# Patient Record
Sex: FEMALE | Race: WHITE | HISPANIC OR LATINO | Employment: FULL TIME | URBAN - METROPOLITAN AREA
[De-identification: names, ages, dates, MRNs, and addresses within clinical notes are randomized per-mention and may not be internally consistent; named-entity substitution may affect disease eponyms.]

---

## 2018-03-12 ENCOUNTER — OFFICE VISIT CONVERTED (OUTPATIENT)
Dept: ORTHOPEDIC SURGERY | Facility: CLINIC | Age: 58
End: 2018-03-12
Attending: PHYSICIAN ASSISTANT

## 2018-04-09 ENCOUNTER — CONVERSION ENCOUNTER (OUTPATIENT)
Dept: ORTHOPEDIC SURGERY | Facility: CLINIC | Age: 58
End: 2018-04-09

## 2018-04-09 ENCOUNTER — OFFICE VISIT CONVERTED (OUTPATIENT)
Dept: ORTHOPEDIC SURGERY | Facility: CLINIC | Age: 58
End: 2018-04-09
Attending: PHYSICIAN ASSISTANT

## 2018-04-30 ENCOUNTER — OFFICE VISIT CONVERTED (OUTPATIENT)
Dept: ORTHOPEDIC SURGERY | Facility: CLINIC | Age: 58
End: 2018-04-30
Attending: PHYSICIAN ASSISTANT

## 2018-08-13 ENCOUNTER — OFFICE VISIT CONVERTED (OUTPATIENT)
Dept: ORTHOPEDIC SURGERY | Facility: CLINIC | Age: 58
End: 2018-08-13
Attending: ORTHOPAEDIC SURGERY

## 2018-12-03 ENCOUNTER — OFFICE VISIT CONVERTED (OUTPATIENT)
Dept: ORTHOPEDIC SURGERY | Facility: CLINIC | Age: 58
End: 2018-12-03
Attending: PHYSICIAN ASSISTANT

## 2018-12-03 ENCOUNTER — CONVERSION ENCOUNTER (OUTPATIENT)
Dept: ORTHOPEDIC SURGERY | Facility: CLINIC | Age: 58
End: 2018-12-03

## 2019-02-20 ENCOUNTER — OFFICE VISIT CONVERTED (OUTPATIENT)
Dept: FAMILY MEDICINE CLINIC | Facility: CLINIC | Age: 59
End: 2019-02-20
Attending: FAMILY MEDICINE

## 2019-02-20 ENCOUNTER — CONVERSION ENCOUNTER (OUTPATIENT)
Dept: FAMILY MEDICINE CLINIC | Facility: CLINIC | Age: 59
End: 2019-02-20

## 2019-03-06 ENCOUNTER — OFFICE VISIT CONVERTED (OUTPATIENT)
Dept: ORTHOPEDIC SURGERY | Facility: CLINIC | Age: 59
End: 2019-03-06
Attending: PHYSICIAN ASSISTANT

## 2019-10-08 ENCOUNTER — CONVERSION ENCOUNTER (OUTPATIENT)
Dept: FAMILY MEDICINE CLINIC | Facility: CLINIC | Age: 59
End: 2019-10-08

## 2019-10-08 ENCOUNTER — OFFICE VISIT CONVERTED (OUTPATIENT)
Dept: FAMILY MEDICINE CLINIC | Facility: CLINIC | Age: 59
End: 2019-10-08
Attending: FAMILY MEDICINE

## 2019-11-01 ENCOUNTER — OFFICE VISIT CONVERTED (OUTPATIENT)
Dept: ORTHOPEDIC SURGERY | Facility: CLINIC | Age: 59
End: 2019-11-01
Attending: ORTHOPAEDIC SURGERY

## 2019-12-05 ENCOUNTER — HOSPITAL ENCOUNTER (OUTPATIENT)
Dept: PERIOP | Facility: HOSPITAL | Age: 59
Setting detail: HOSPITAL OUTPATIENT SURGERY
Discharge: HOME | End: 2019-12-05
Attending: ORTHOPAEDIC SURGERY

## 2019-12-18 ENCOUNTER — OFFICE VISIT CONVERTED (OUTPATIENT)
Dept: ORTHOPEDIC SURGERY | Facility: CLINIC | Age: 59
End: 2019-12-18
Attending: PHYSICIAN ASSISTANT

## 2019-12-19 ENCOUNTER — OFFICE VISIT CONVERTED (OUTPATIENT)
Dept: FAMILY MEDICINE CLINIC | Facility: CLINIC | Age: 59
End: 2019-12-19
Attending: FAMILY MEDICINE

## 2019-12-19 ENCOUNTER — CONVERSION ENCOUNTER (OUTPATIENT)
Dept: FAMILY MEDICINE CLINIC | Facility: CLINIC | Age: 59
End: 2019-12-19

## 2020-01-03 ENCOUNTER — HOSPITAL ENCOUNTER (OUTPATIENT)
Dept: MRI IMAGING | Facility: HOSPITAL | Age: 60
Discharge: HOME | End: 2020-01-03
Attending: FAMILY MEDICINE

## 2020-01-14 ENCOUNTER — OFFICE VISIT CONVERTED (OUTPATIENT)
Dept: FAMILY MEDICINE CLINIC | Facility: CLINIC | Age: 60
End: 2020-01-14
Attending: FAMILY MEDICINE

## 2020-01-15 ENCOUNTER — OFFICE VISIT CONVERTED (OUTPATIENT)
Dept: ORTHOPEDIC SURGERY | Facility: CLINIC | Age: 60
End: 2020-01-15
Attending: PHYSICIAN ASSISTANT

## 2020-06-24 ENCOUNTER — OFFICE VISIT CONVERTED (OUTPATIENT)
Dept: FAMILY MEDICINE CLINIC | Facility: CLINIC | Age: 60
End: 2020-06-24
Attending: FAMILY MEDICINE

## 2020-08-17 ENCOUNTER — OFFICE VISIT CONVERTED (OUTPATIENT)
Dept: FAMILY MEDICINE CLINIC | Facility: CLINIC | Age: 60
End: 2020-08-17
Attending: FAMILY MEDICINE

## 2020-11-17 ENCOUNTER — OFFICE VISIT CONVERTED (OUTPATIENT)
Dept: FAMILY MEDICINE CLINIC | Facility: CLINIC | Age: 60
End: 2020-11-17
Attending: FAMILY MEDICINE

## 2021-02-22 ENCOUNTER — OFFICE VISIT CONVERTED (OUTPATIENT)
Dept: FAMILY MEDICINE CLINIC | Facility: CLINIC | Age: 61
End: 2021-02-22
Attending: FAMILY MEDICINE

## 2021-05-13 NOTE — PROGRESS NOTES
Progress Note      Patient Name: Shereen Alanis   Patient ID: [id number]   Sex: Female   YOB: 1960    Primary Care Provider: Augusto Leung DO   Referring Provider: Augusto Leung DO    Visit Date: November 17, 2020    Provider: Augusto Leung DO   Location: [facility]   Location Address: [address] [place] [address], [address] [place] [address]  [id number]   Location Phone: [phone]          Chief Complaint  · check up      History Of Present Illness  Shereen Alanis is a 60 year old /White,  or  female who presents for evaluation and treatment of:      Patient presents today for checkup.  She does need a renewal of referrals for her macular degeneration as a well as Stargardt's disease.  She also reports having a left earache.  She does have issues with allergies.  She has not been taking her Flonase regularly.  Denies any worsening nasal congestion.  Denies any fever chills.  Denies any sore throat.       Past Medical History  Aftercare following left knee joint replacement surgery; Allergies; Arthritis; Breast cancer screening; Cataract; Cervical cancer screening; Colon cancer screening; Essential tremor; Hyperlipemia; Left knee pain; Limb Swelling; Primary osteoarthritis of left knee; Seasonal allergies         Past Surgical History  Colonoscopy; Joint Surgery; Knee surgery         Medication List  cetirizine 10 mg oral tablet; fluticasone propionate 50 mcg/actuation nasal spray,suspension; medroxyprogesterone 2.5 mg oral tablet; meloxicam 15 mg oral tablet; prednisolone acetate 1 % ophthalmic (eye) drops,suspension; Premarin 0.3 mg oral tablet         Allergy List  SULFA (SULFONAMIDES)       Allergies Reconciled  Family Medical History  Cancer, Unspecified; Diabetes, unspecified type; Renal Cancer; *No Known Family History; Family history of Arthritis         Social History  Alcohol (Current some day); Alcohol Use (Current some day); Caffeine (Unknown);  "lives with spouse; .; Recreational Drug Use (Never); Second hand smoke exposure (Never); Tobacco (Never); Working         Immunizations  Name Date Admin   Influenza Refused   Influenza Refused   Influenza Refused   Influenza Refused   Influenza 11/14/2019         Review of Systems     Gen: Denies any fever, chills, or weight changes  HEENT: As discussed above  Extremities: Denies edema  Psychiatric: Denies any changes in mood or affect  Neurologic: Denies any deficits  Skin: Denies any rashes       Vitals  Date Time BP Position Site L\R Cuff Size HR RR TEMP (F) WT  HT  BMI kg/m2 BSA m2 O2 Sat FR L/min FiO2 HC       11/17/2020 02:34 /74 Sitting    69 - R  97.5 152lbs 4oz 5'  3\" 26.97 1.75 99 %            Physical Examination     General: AAO 3, no acute distress, pleasant  HEENT: Normocephalic, atraumatic, TM intact bilaterally with no erythema, no discharge from the eyes, no discharge from the nose, no oropharyngeal erythema or exudate, no cervical tenderness lymphadenopathy  Cardiovascular: Regular rate and rhythm without appreciable murmur  Respiratory: Clear to auscultation bilaterally no RRW  Gastrointestinal: Soft nontender nondistended with bowel sounds present  extremities: No clubbing, cyanosis or edema  Neurologic: CN II through XII grossly intact   Psychiatric: Normal mood and affect           Assessment  · Need for influenza vaccination     V04.81/Z23  · Macular degeneration     362.50/H35.30  · Stargardt's disease     362.75/H35.53  · Right shoulder pain     719.41/M25.511  · Pain     780.96/R52  · Left ear pain     388.70/H92.02  · Eustachian tube dysfunction, left     381.81/H69.82  Discussed with patient conservative treatment at this time. I have encouraged her to use Flonase regularly. She will also continue taking cetirizine. She reports that it took a while to get back on her allergy injections but she is doing those now. I encouraged her to follow-up with her allergist as well. I do " not suspect otitis media at this time. If symptoms worsen she is instructed to call or return. Otherwise I will see her back in 3 months.      Plan  · Orders  o ACO-39: Current medications updated and reviewed (1159F, ) - - 11/17/2020  o ACO-14: Influenza immunization was not administered for reasons documented [facility] () - - 11/17/2020   Declines  o OPHTHALMOLOGY CONSULTATION (OPHTH) - 362.50/H35.30, 362.75/H35.53 - 11/17/2020   Needs two referral. one to [facility]. Needs appointments with Dr. Jens kothari OPHTHALMOLOGY CONSULTATION (OPHTH) - 362.50/H35.30, 362.75/H35.53 - 11/17/2020   Needs two referrals. Needs to see Dr. Sigifredo Fragoso in [place] with eye [facility].   · Medications  o cetirizine 10 mg oral tablet   SIG: take 1 tablet (10 mg) by oral route once daily   DISP: (90) Tablet with 3 refills  Prescribed on 11/17/2020     o meloxicam 15 mg oral tablet   SIG: take 1 tablet (15 mg) by oral route once daily as needed for pain   DISP: (30) Tablet with 3 refills  Refilled on 11/17/2020     · Instructions  o Patient was educated/instructed on their diagnosis, treatment and medications prior to discharge from the clinic today.  o Patient instructed to seek medical attention urgently for new or worsening symptoms.  o Call the office with any concerns or questions.  · Disposition  o Follow Up in 3 months.            Electronically Signed by: Augusto Leung DO -Author on November 17, 2020 05:45:02 PM

## 2021-05-13 NOTE — PROGRESS NOTES
Progress Note      Patient Name: Shereen Alanis   Patient ID: [id number]   Sex: Female   YOB: 1960    Primary Care Provider: Augusto Leung DO   Referring Provider: Augusto Leung DO    Visit Date: August 17, 2020    Provider: Augusto Leung DO   Location: [facility]   Location Address: [address] [place] [address], [address]  [id number]   Location Phone: [phone]          Chief Complaint  · 1 month follow up       History Of Present Illness  Shereen Alanis is a 59 year old /White,  or  female who presents for evaluation and treatment of:      Patient presents today for one-month follow-up.  She is back in to see podiatry and reports that her left Achilles tendinopathy is improving.  She is taking meloxicam on an as-needed basis.  She reports that she stopped taking medroxyprogesterone however she was on a cyclical dose of 2.5 mg 14 days each month.  She reports it has been 2 weeks since her last dose so she really has not missed any but she stopped taking it.  She is still taking Premarin.  I discussed with her given her history of having an intact uterus and that she needs to avoid unopposed estrogen.  She did have about a month ago light spotting when she wiped and this again happened on Thursday.  She has not had any recurrence since.  Discussed with her hormone replacement therapy for vasomotor symptoms.  I discussed with her risk and benefits in the past including increased risk of breast cancer and endometrial cancer.  She verbalized understanding.  She would like to continue current management for now which I feel is reasonable.  If she has any recurrence of any bleeding discussed with her that she will need further work-up and evaluation.       Past Medical History  Aftercare following left knee joint replacement surgery; Allergies; Arthritis; Breast cancer screening; Cataract; Cervical cancer screening; Colon cancer screening; Essential tremor;  "Hyperlipemia; Left knee pain; Limb Swelling; Primary osteoarthritis of left knee; Seasonal allergies         Past Surgical History  Colonoscopy; Joint Surgery; Knee surgery         Medication List  cetirizine 10 mg oral tablet; fluticasone propionate 50 mcg/actuation nasal spray,suspension; meloxicam 15 mg oral tablet; prednisolone acetate 1 % ophthalmic (eye) drops,suspension         Allergy List  SULFA (SULFONAMIDES)         Family Medical History  Cancer, Unspecified; Diabetes, unspecified type; Renal Cancer; *No Known Family History; Family history of Arthritis         Social History  Alcohol (Current some day); Alcohol Use (Current some day); Caffeine (Unknown); lives with spouse; .; Recreational Drug Use (Never); Second hand smoke exposure (Never); Tobacco (Never); Working         Immunizations  Name Date Admin   Influenza          Review of Systems     General: Denies any fever, chills, weight changes, or night sweats  HEENT:  Denies any vision or hearing changes. Denies any neck tenderness. Denies any headaches. Denies nasal congestion  Cardiovascular: Denies any chest pain or palpitations  respiratory: Denies any cough or wheezing. Denies any shortness of breath  Gastrointestinal: Denies any nausea vomiting or diarrhea, Denies constipation  Extremities: Denies any edema  Psychiatric: Denies any changes in mood or affect  Neurologic: Denies any neurologic deficits  skin: Denies any rashes or lesions.  endocrine: Denies any weight loss, fever, night sweats  Musculoskeletal: As discussed above       Vitals  Date Time BP Position Site L\R Cuff Size HR RR TEMP (F) WT  HT  BMI kg/m2 BSA m2 O2 Sat        08/17/2020 03:50 /72 Sitting    68 - R  96.6 150lbs 4oz 5'  3\" 26.62 1.74 98 %          Physical Examination     General: AAO 3, no acute distress, pleasant  HEENT: Normocephalic, atraumatic  Cardiovascular: Regular rate and rhythm without appreciable murmur  Respiratory: Clear to auscultation " bilaterally no RRW  Gastrointestinal: Soft nontender nondistended with bowel sounds present  extremities: No clubbing, cyanosis or edema  Neurologic: CN II through XII grossly intact   Psychiatric: Normal mood and affect           Assessment  · Visit for screening mammogram     V76.12/Z12.31  · Screening for depression     V79.0/Z13.31  · Vasomotor symptoms due to menopause     627.2/N95.1  · Achilles tendinosis of left lower extremity     726.71/M67.88      Plan  · Orders  o Screening Mammography; Bilateral 3D (15497, 42138, ) - V76.12/Z12.31 - 09/21/2020  o ACO-39: Current medications updated and reviewed () - - 08/17/2020  o ACO-18: Negative screen for clinical depression using a standardized tool () - V79.0/Z13.31 - 08/17/2020   score 0  · Medications  o medroxyprogesterone 2.5 mg oral tablet   SIG: take 1 tablet PO daily   DISP: (90) tablets with 1 refills  Prescribed on 08/17/2020     o Premarin 0.3 mg oral tablet   SIG: take 1 tablet (0.3 mg) by oral route once daily for 90 days   DISP: (90) tablets with 1 refills  Prescribed on 08/17/2020     o estrogens-methyltestosterone 0.625-1.25 mg oral tablet   SIG: ---   DISP: (0) tablet with 0 refills  Discontinued on 08/17/2020     o Medications have been Reconciled  o Transition of Care or Provider Policy  · Instructions  o Patient was educated/instructed on their diagnosis, treatment and medications prior to discharge from the clinic today.  o Call the office with any concerns or questions.  o Patient has previously been on medroxyprogesterone and Premarin. I tried switching her to estradiol and progesterone but she had weight gain. She is currently satisfied with her current treatment for vasomotor symptoms. They are most bothersome at work. Discussed with her risk and benefits. She does need a mammogram. I will see her back in 3 months.  · Disposition  o Follow Up in 1 month.            Electronically Signed by: Augusto Leung DO -Author on  August 17, 2020 05:15:27 PM

## 2021-05-13 NOTE — PROGRESS NOTES
Progress Note      Patient Name: Shereen Alanis   Patient ID: [id number]   Sex: Female   YOB: 1960    Primary Care Provider: Augusto Leung DO   Referring Provider: Augusto Leung DO    Visit Date: June 24, 2020    Provider: Augusto Leung DO   Location: [facility]   Location Address: [address] [place] [address], [address]  [id number]   Location Phone: [phone]          Chief Complaint  · acute   · left heel pain      History Of Present Illness  Shereen Alanis is a 59 year old /White,  or  female who presents for evaluation and treatment of:      .  Her Achilles tendon continues to bother her.  I ordered an MRI of her foot back in January which showed a moderate distal Achilles tendinopathy with an otherwise unremarkable MRI of the ankle, hindfoot and midfoot.  Patient did see Dr. Diaz and was given a boot and this helped but then her hip was starting to hurt so she stopped with the boot.  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  Diclofenac has helped some but not that well.  Discussed switching her to meloxicam.  She is requesting a referral back to see her podiatrist again as her referral ran out.  She is still having a lot of pain in the Achilles tendon region on the left.       Past Medical History  Aftercare following left knee joint replacement surgery; Allergies; Arthritis; Breast cancer screening; Cataract; Cervical cancer screening; Colon cancer screening; Essential tremor; Hyperlipemia; Left knee pain; Limb Swelling; Primary osteoarthritis of left knee; Seasonal allergies         Past Surgical History  Colonoscopy; Joint Surgery; Knee surgery         Medication List  cetirizine 10 mg oral tablet; fluticasone propionate 50 mcg/actuation nasal spray,suspension; medroxyprogesterone 2.5 mg oral tablet; prednisolone acetate 1 % ophthalmic (eye) drops,suspension         Allergy List  SULFA  "(SULFONAMIDES)         Family Medical History  Cancer, Unspecified; Diabetes, unspecified type; Renal Cancer; *No Known Family History; Family history of Arthritis         Social History  Alcohol (Current some day); Alcohol Use (Current some day); Caffeine (Unknown); lives with spouse; .; Recreational Drug Use (Never); Second hand smoke exposure (Never); Tobacco (Never); Working         Immunizations  Name Date Admin   Influenza          Review of Systems     Gen: Denies any fever, chills, or weight changes  HEENT: Denies any changes in vision or hearing  Extremities: Denies edema  Psychiatric: Denies any changes in mood or affect  Neurologic: Denies any deficits  Skin: Denies any rashes  Musculoskeletal: As described above       Vitals  Date Time BP Position Site L\R Cuff Size HR RR TEMP (F) WT  HT  BMI kg/m2 BSA m2 O2 Sat HC       06/24/2020 01:17 /74 Sitting    78 - R  97.5 155lbs 4oz 5'  3\" 27.5 1.77 97 %          Physical Examination     General: AAO 3, no acute distress, pleasant  Musculoskeletal: Patient has tenderness over the Achilles tendon on the left.  No mass or deformity appreciated.           Assessment  · Achilles tendinosis of left lower extremity     726.71/M67.88    Problems Reconciled  Plan  · Orders  o ACO-39: Current medications updated and reviewed () - - 06/24/2020  o PODIATRY CONSULTATION (PODIA) - 726.71/M67.88 - 06/24/2020   Needs renewal of referral to Dr. Juan Luis Diaz. [phone]  · Medications  o meloxicam 15 mg oral tablet   SIG: take 1 tablet (15 mg) by oral route once daily as needed for pain   DISP: (30) tablets with 3 refills  Prescribed on 06/24/2020     o diclofenac sodium 75 mg oral tablet,delayed release (/EC)   SIG: take 1 tablet (75 mg) by oral route 2 times per day   DISP: (60) tablets with 1 refills  Discontinued on 06/24/2020     o Medications have been Reconciled  o Transition of Care or Provider Policy  · Instructions  o Patient was " educated/instructed on their diagnosis, treatment and medications prior to discharge from the clinic today.  o Patient instructed to seek medical attention urgently for new or worsening symptoms.  o Call the office with any concerns or questions.  o Patient continues to have pain. I will refer her back to podiatry and give her prescription for meloxicam and see her back in 2 to 3 weeks. She expressed desire to not be on anything stronger at this time. Patient is encouraged to ice the area regularly. She is instructed call or return if she has any worsening symptoms or no improvement.  · Disposition  o Follow Up in 2 weeks.            Electronically Signed by: Augusto Leung DO -Author on June 24, 2020 01:41:31 PM

## 2021-05-14 VITALS
TEMPERATURE: 96.6 F | WEIGHT: 150.25 LBS | HEART RATE: 68 BPM | BODY MASS INDEX: 26.62 KG/M2 | SYSTOLIC BLOOD PRESSURE: 108 MMHG | DIASTOLIC BLOOD PRESSURE: 72 MMHG | OXYGEN SATURATION: 98 % | HEIGHT: 63 IN

## 2021-05-14 VITALS
SYSTOLIC BLOOD PRESSURE: 108 MMHG | DIASTOLIC BLOOD PRESSURE: 76 MMHG | WEIGHT: 159.56 LBS | HEIGHT: 63 IN | OXYGEN SATURATION: 98 % | HEART RATE: 75 BPM | TEMPERATURE: 97.1 F | BODY MASS INDEX: 28.27 KG/M2

## 2021-05-14 VITALS
BODY MASS INDEX: 26.98 KG/M2 | SYSTOLIC BLOOD PRESSURE: 116 MMHG | OXYGEN SATURATION: 99 % | HEART RATE: 69 BPM | DIASTOLIC BLOOD PRESSURE: 74 MMHG | HEIGHT: 63 IN | TEMPERATURE: 97.5 F | WEIGHT: 152.25 LBS

## 2021-05-14 NOTE — PROGRESS NOTES
Progress Note      Patient Name: Shereen Alanis   Patient ID: [id number]   Sex: Female   YOB: 1960    Primary Care Provider: Augusto Leung DO   Referring Provider: Augusto Leung DO    Visit Date: February 22, 2021    Provider: Augusto Leung DO   Location: [facility]   Location Address: [address] [place] [address], [address]  [place], [place]  [id number]   Location Phone: [phone]          Chief Complaint  · check up      History Of Present Illness  Shereen Alanis is a 60 year old /White,  or  female who presents for evaluation and treatment of:      Patient presents today for checkup.  She is due for labs so we will get these done.  Previously had noted triglycerides over 500 but then they improved down to the 160s.  She was also previously been noted to have prediabetes.  She reports her vision changes are stable from Stargardt.  She is still seeing ophthalmology.  She is still having issues with Achilles tendinopathy of the left lower extremity started to have some on the right with similar symptoms.  She has seen podiatry.  She has been doing taping before.  I did give her 2 Ace wraps today.  She previously did have an MRI of her foot done back in January which showed moderate distal Achilles tendinopathy.  It mainly aggravates her while at work.  She walks about 5 miles a day when she goes to work.       Past Medical History  Aftercare following left knee joint replacement surgery; Allergies; Arthritis; Breast cancer screening; Cataract; Cervical cancer screening; Colon cancer screening; Essential tremor; Hyperlipemia; Left knee pain; Limb Swelling; Primary osteoarthritis of left knee; Seasonal allergies         Past Surgical History  Colonoscopy; Joint Surgery; Knee surgery         Medication List  fluticasone propionate 50 mcg/actuation nasal spray,suspension; meloxicam 15 mg oral tablet; prednisolone acetate 1 % ophthalmic (eye) drops,suspension  "        Allergy List  SULFA (SULFONAMIDES)         Family Medical History  Cancer, Unspecified; Diabetes, unspecified type; Renal Cancer; *No Known Family History; Family history of Arthritis         Social History  Alcohol (Current some day); Alcohol Use (Current some day); Caffeine (Unknown); lives with spouse; .; Recreational Drug Use (Never); Second hand smoke exposure (Never); Tobacco (Never); Working         Immunizations  Name Date Admin   Influenza Refused   Influenza Refused   Influenza Refused   Influenza Refused   Influenza 11/14/2019         Review of Systems     Gen: Denies any fever, chills, or weight changes  HEENT: As discussed above  Extremities: Denies edema  Musculoskeletal: As discussed above  Psychiatric: Denies any changes in mood or affect  Neurologic: Denies any deficits  Skin: Denies any rashes       Vitals  Date Time BP Position Site L\R Cuff Size HR RR TEMP (F) WT  HT  BMI kg/m2 BSA m2 O2 Sat FR L/min FiO2 HC       02/22/2021 03:56 /76 Sitting    75 - R  97.1 159lbs 9oz 5'  3\" 28.26 1.79 98 %            Physical Examination     General: AAO 3, no acute distress, pleasant  HEENT: Normocephalic, atraumatic  Cardiovascular: Regular rate and rhythm without appreciable murmur  Respiratory: Clear to auscultation bilaterally no RRW  Gastrointestinal: Soft nontender nondistended with bowel sounds present  Musculoskeletal: Tenderness to palpation of Achilles tendon bilaterally.  Both appear intact with no defect noted.  extremities: No edema  Neurologic: CN II through XII grossly intact   Psychiatric: Normal mood and affect           Assessment  · Fatigue     780.79/R53.83  · Vitamin D deficiency     268.9/E55.9  · Screening for lipid disorders     V77.91/Z13.220  · Prediabetes     790.29/R73.03  · Hypertriglyceridemia     272.1/E78.1  · Achilles tendinitis of both lower extremities       Achilles tendinitis, right leg     726.71/M76.61  Achilles tendinitis, left " leg     726.71/M76.62  Discussed using an Ace wrap, icing and stretching. I will see her back in 6 months or sooner if needed. Patient to get labs done at her earliest convenience.      Plan  · Orders  o CBC with Auto Diff [facility] (39080) - 790.29/R73.03 - 02/22/2021  o CMP [facility] (14559) - 790.29/R73.03 - 02/22/2021  o Hgb A1c [facility] (93016) - 790.29/R73.03 - 02/22/2021  o Lipid Panel [facility] (51493) - 272.1/E78.1 - 02/22/2021  o Thyroid Profile (76988, THYII, 02361) - 780.79/R53.83 - 02/22/2021  o Vitamin D (25-Hydroxy) Level (16560) - 268.9/E55.9 - 02/22/2021  o ACO-14: Influenza immunization was not administered for reasons documented [facility] () - - 02/22/2021   Declines  o ACO-39: Current medications updated and reviewed (1159F, ) - - 02/22/2021  · Instructions  o Patient was educated/instructed on their diagnosis, treatment and medications prior to discharge from the clinic today.  o Patient instructed to seek medical attention urgently for new or worsening symptoms.  o Call the office with any concerns or questions.  · Disposition  o Follow Up in 6 months.            Electronically Signed by: Augusto Leung DO - on February 22, 2021 05:12:16 PM

## 2021-05-15 VITALS — WEIGHT: 158 LBS | HEART RATE: 71 BPM | OXYGEN SATURATION: 99 % | HEIGHT: 63 IN | BODY MASS INDEX: 28 KG/M2

## 2021-05-15 VITALS — OXYGEN SATURATION: 97 % | HEART RATE: 69 BPM | WEIGHT: 147 LBS | HEIGHT: 63 IN | BODY MASS INDEX: 26.05 KG/M2

## 2021-05-15 VITALS
HEIGHT: 63 IN | TEMPERATURE: 98.7 F | OXYGEN SATURATION: 98 % | DIASTOLIC BLOOD PRESSURE: 76 MMHG | HEART RATE: 70 BPM | WEIGHT: 151 LBS | SYSTOLIC BLOOD PRESSURE: 112 MMHG | BODY MASS INDEX: 26.75 KG/M2

## 2021-05-15 VITALS
HEIGHT: 63 IN | OXYGEN SATURATION: 97 % | DIASTOLIC BLOOD PRESSURE: 74 MMHG | TEMPERATURE: 97.5 F | BODY MASS INDEX: 27.51 KG/M2 | HEART RATE: 78 BPM | WEIGHT: 155.25 LBS | SYSTOLIC BLOOD PRESSURE: 134 MMHG

## 2021-05-15 VITALS
OXYGEN SATURATION: 96 % | HEIGHT: 63 IN | TEMPERATURE: 98.3 F | SYSTOLIC BLOOD PRESSURE: 100 MMHG | DIASTOLIC BLOOD PRESSURE: 66 MMHG | HEART RATE: 75 BPM | WEIGHT: 160.5 LBS | BODY MASS INDEX: 28.44 KG/M2

## 2021-05-15 VITALS — BODY MASS INDEX: 26.75 KG/M2 | HEART RATE: 70 BPM | WEIGHT: 151 LBS | OXYGEN SATURATION: 99 % | HEIGHT: 63 IN

## 2021-05-15 VITALS — HEIGHT: 63 IN | HEART RATE: 63 BPM | OXYGEN SATURATION: 97 % | WEIGHT: 151 LBS | BODY MASS INDEX: 26.75 KG/M2

## 2021-05-15 VITALS
HEART RATE: 76 BPM | OXYGEN SATURATION: 97 % | HEIGHT: 63 IN | BODY MASS INDEX: 27.55 KG/M2 | WEIGHT: 155.5 LBS | SYSTOLIC BLOOD PRESSURE: 112 MMHG | TEMPERATURE: 98.2 F | DIASTOLIC BLOOD PRESSURE: 62 MMHG

## 2021-05-15 VITALS
TEMPERATURE: 98.4 F | HEIGHT: 63 IN | DIASTOLIC BLOOD PRESSURE: 68 MMHG | BODY MASS INDEX: 26.82 KG/M2 | WEIGHT: 151.37 LBS | SYSTOLIC BLOOD PRESSURE: 110 MMHG | OXYGEN SATURATION: 99 % | HEART RATE: 65 BPM

## 2021-05-15 VITALS — OXYGEN SATURATION: 99 % | HEART RATE: 86 BPM | HEIGHT: 63 IN | BODY MASS INDEX: 27.64 KG/M2 | WEIGHT: 156 LBS

## 2021-05-16 VITALS — WEIGHT: 147 LBS | OXYGEN SATURATION: 97 % | HEART RATE: 78 BPM | HEIGHT: 63 IN | BODY MASS INDEX: 26.05 KG/M2

## 2021-05-16 VITALS — WEIGHT: 148.12 LBS | HEART RATE: 95 BPM | BODY MASS INDEX: 26.25 KG/M2 | HEIGHT: 63 IN | OXYGEN SATURATION: 99 %

## 2021-05-16 VITALS — WEIGHT: 148 LBS | OXYGEN SATURATION: 97 % | HEIGHT: 63 IN | HEART RATE: 94 BPM | BODY MASS INDEX: 26.22 KG/M2

## 2021-05-16 VITALS — WEIGHT: 149 LBS | BODY MASS INDEX: 26.4 KG/M2 | OXYGEN SATURATION: 97 % | HEIGHT: 63 IN | HEART RATE: 106 BPM

## 2021-06-18 ENCOUNTER — TELEPHONE (OUTPATIENT)
Dept: FAMILY MEDICINE CLINIC | Facility: CLINIC | Age: 61
End: 2021-06-18

## 2021-06-18 DIAGNOSIS — M67.88 ACHILLES TENDINOSIS OF LEFT LOWER EXTREMITY: Primary | ICD-10-CM

## 2021-06-18 NOTE — TELEPHONE ENCOUNTER
Caller: Ryann Alanis    Relationship: Self    Best call back number: [phone]     What is the best time to reach you: ANYTIME     Who are you requesting to speak with (clinical staff, provider,  specific staff member): CLINICAL STAFF     Do you know the name of the person who called: RYANN ALANIS    What was the call regarding: PATIENT IS NEEDING A REFERRAL TO SEE A PODIATRY.PLEASE CALL AND ADVISE.     Do you require a callback: YES

## 2021-06-28 NOTE — TELEPHONE ENCOUNTER
PATIENT CALLED TO CHECK ON THE STATUS OF REFERRAL. PLEASE CALL AND ADVISE.    CALLBACK #: [phone]

## 2021-07-16 ENCOUNTER — TELEPHONE (OUTPATIENT)
Dept: FAMILY MEDICINE CLINIC | Facility: CLINIC | Age: 61
End: 2021-07-16

## 2021-07-16 RX ORDER — ACETAMINOPHEN 325 MG/1
TABLET ORAL
COMMUNITY

## 2021-07-16 RX ORDER — FLUTICASONE PROPIONATE 50 MCG
SPRAY, SUSPENSION (ML) NASAL
COMMUNITY
End: 2021-08-31 | Stop reason: SDUPTHER

## 2021-07-16 RX ORDER — MELOXICAM 15 MG/1
TABLET ORAL
COMMUNITY
Start: 2021-05-28 | End: 2021-07-16 | Stop reason: SDUPTHER

## 2021-07-16 RX ORDER — ESTERIFIED ESTROGEN AND METHYLTESTOSTERONE .625; 1.25 MG/1; MG/1
TABLET ORAL
COMMUNITY
End: 2021-07-16

## 2021-07-16 RX ORDER — MELOXICAM 15 MG/1
TABLET ORAL
Qty: 60 TABLET | Refills: 1 | Status: SHIPPED | OUTPATIENT
Start: 2021-07-16 | End: 2021-09-27

## 2021-08-31 ENCOUNTER — OFFICE VISIT (OUTPATIENT)
Dept: FAMILY MEDICINE CLINIC | Facility: CLINIC | Age: 61
End: 2021-08-31

## 2021-08-31 VITALS
SYSTOLIC BLOOD PRESSURE: 132 MMHG | RESPIRATION RATE: 18 BRPM | TEMPERATURE: 98.2 F | BODY MASS INDEX: 25.69 KG/M2 | OXYGEN SATURATION: 96 % | WEIGHT: 145 LBS | DIASTOLIC BLOOD PRESSURE: 70 MMHG | HEIGHT: 63 IN | HEART RATE: 74 BPM

## 2021-08-31 DIAGNOSIS — M79.671 RIGHT FOOT PAIN: ICD-10-CM

## 2021-08-31 DIAGNOSIS — M25.571 CHRONIC PAIN OF RIGHT ANKLE: ICD-10-CM

## 2021-08-31 DIAGNOSIS — M67.88 ACHILLES TENDINOSIS OF LEFT LOWER EXTREMITY: ICD-10-CM

## 2021-08-31 DIAGNOSIS — M67.88 ACHILLES TENDINOSIS OF RIGHT LOWER EXTREMITY: Primary | ICD-10-CM

## 2021-08-31 DIAGNOSIS — G89.29 CHRONIC PAIN OF RIGHT ANKLE: ICD-10-CM

## 2021-08-31 PROBLEM — Z12.4 CERVICAL CANCER SCREENING: Status: ACTIVE | Noted: 2021-08-31

## 2021-08-31 PROBLEM — Z96.652 AFTERCARE FOLLOWING LEFT KNEE JOINT REPLACEMENT SURGERY: Status: ACTIVE | Noted: 2018-04-09

## 2021-08-31 PROBLEM — Z12.11 COLON CANCER SCREENING: Status: ACTIVE | Noted: 2021-08-31

## 2021-08-31 PROBLEM — M19.90 ARTHRITIS: Status: ACTIVE | Noted: 2021-08-31

## 2021-08-31 PROBLEM — G25.0 ESSENTIAL TREMOR: Status: ACTIVE | Noted: 2019-02-20

## 2021-08-31 PROBLEM — E78.5 HYPERLIPEMIA: Status: ACTIVE | Noted: 2021-08-31

## 2021-08-31 PROBLEM — M79.89 LIMB SWELLING: Status: ACTIVE | Noted: 2021-08-31

## 2021-08-31 PROBLEM — M17.9 OSTEOARTHRITIS OF KNEE: Status: ACTIVE | Noted: 2017-10-11

## 2021-08-31 PROBLEM — M25.562 LEFT KNEE PAIN: Status: ACTIVE | Noted: 2021-08-31

## 2021-08-31 PROBLEM — H26.9 CATARACT: Status: ACTIVE | Noted: 2021-08-31

## 2021-08-31 PROBLEM — Z47.1 AFTERCARE FOLLOWING LEFT KNEE JOINT REPLACEMENT SURGERY: Status: ACTIVE | Noted: 2018-04-09

## 2021-08-31 PROBLEM — J30.2 SEASONAL ALLERGIC RHINITIS: Status: ACTIVE | Noted: 2021-08-31

## 2021-08-31 PROCEDURE — 99213 OFFICE O/P EST LOW 20 MIN: CPT | Performed by: FAMILY MEDICINE

## 2021-08-31 RX ORDER — FLUTICASONE PROPIONATE 50 MCG
2 SPRAY, SUSPENSION (ML) NASAL DAILY
Qty: 42 G | Refills: 3 | Status: SHIPPED | OUTPATIENT
Start: 2021-08-31 | End: 2021-10-05 | Stop reason: SDUPTHER

## 2021-08-31 RX ORDER — CETIRIZINE HYDROCHLORIDE 10 MG/1
10 TABLET ORAL DAILY
COMMUNITY

## 2021-08-31 NOTE — PROGRESS NOTES
"Chief Complaint  Right ankle pain  Subjective          Shereen Alanis presents to [facility]  History of Present Illness  Patient presents today to discuss right ankle pain.  This unfortunately has become a chronic issue for her.  I last saw her on 2/22/2021 and she was having issues with Achilles tendinopathy of her left lower extremity but then started to have some on the right with similar symptoms.  She is seeing podiatrist, Dr. Diaz and reports even receiving a corticosteroid injection which did not help.  She has been wrapping and taping but it has not been helping.  She continues have issues and now she is in a boot.  She has pain over the Achilles tendon on the right.  Denies any injury.  She is requesting [facility] paperwork be filled out on her behalf.  She reports that her son who lives in [place] has no one to take care of his 3-month-old daughter while he works.  She will be leaving on Saturday and be gone until 9/18/2021 to help take care of her.  She reports that she will be retiring soon from work anyway.  Depression screening has been reviewed and it is negative.  Objective   Vital Signs:   /70   Pulse 74   Temp 98.2 °F (36.8 °C) (Oral)   Resp 18   Ht 160 cm (63\")   Wt 65.8 kg (145 lb)   SpO2 96%   BMI 25.69 kg/m²     Physical Exam   General: AAO ×3, no acute distress, pleasant  HEENT: Normocephalic, atraumatic  Cardiovascular: Regular rate and rhythm without appreciable murmur  Respiratory: Clear to auscultation bilaterally no RRW  Musculoskeletal: Negative Ferguson's test on the right.  She does have pain when palpating the Achilles tendon with no mass or deformity.  extremities: No edema  Neurologic: CN II through XII grossly intact   Psychiatric: Normal mood and affect  Result Review :                 Assessment and Plan    Diagnoses and all orders for this visit:    1. Achilles tendinosis of right lower extremity (Primary)  -     MRI Ankle Right " Without Contrast; Future    2. Achilles tendinosis of left lower extremity    3. Right foot pain    4. Chronic pain of right ankle  -     MRI Ankle Right Without Contrast; Future    Other orders  -     fluticasone (FLONASE) 50 MCG/ACT nasal spray; 2 sprays into the nostril(s) as directed by provider Daily.  Dispense: 42 g; Refill: 3    Discussed with patient getting a MRI of the right ankle to further evaluate her Achilles tendinopathy to evaluate for any tearing or rupture.  Patient instructed to call with any questions or concerns.  I will see her back in 1 month for follow-up.  Patient will continue with care with podiatry, Dr. Diaz.  Her left Achilles tendinopathy has been stable and she reports doing well from that standpoint.    Follow Up   Return in about 1 month (around 9/30/2021) for right ankle pain.  Patient was given instructions and counseling regarding her condition or for health maintenance advice. Please see specific information pulled into the AVS if appropriate.

## 2021-09-20 ENCOUNTER — HOSPITAL ENCOUNTER (OUTPATIENT)
Dept: MRI IMAGING | Facility: HOSPITAL | Age: 61
Discharge: HOME | End: 2021-09-20
Admitting: FAMILY MEDICINE

## 2021-09-20 DIAGNOSIS — M67.88 ACHILLES TENDINOSIS OF RIGHT LOWER EXTREMITY: ICD-10-CM

## 2021-09-20 DIAGNOSIS — S86.011S: Primary | ICD-10-CM

## 2021-09-20 DIAGNOSIS — M25.571 CHRONIC PAIN OF RIGHT ANKLE: ICD-10-CM

## 2021-09-20 DIAGNOSIS — G89.29 CHRONIC PAIN OF RIGHT ANKLE: ICD-10-CM

## 2021-09-20 DIAGNOSIS — M67.88 ACHILLES TENDINOSIS OF LEFT LOWER EXTREMITY: ICD-10-CM

## 2021-09-20 PROBLEM — S86.019A: Status: ACTIVE | Noted: 2021-09-20

## 2021-09-20 PROCEDURE — 73721 MRI JNT OF LWR EXTRE W/O DYE: CPT

## 2021-09-24 ENCOUNTER — PREP FOR SURGERY (OUTPATIENT)
Dept: OTHER | Facility: HOSPITAL | Age: 61
End: 2021-09-24

## 2021-09-24 ENCOUNTER — OFFICE VISIT (OUTPATIENT)
Dept: ORTHOPEDIC SURGERY | Facility: CLINIC | Age: 61
End: 2021-09-24

## 2021-09-24 VITALS — HEIGHT: 63 IN | WEIGHT: 150 LBS | HEART RATE: 76 BPM | BODY MASS INDEX: 26.58 KG/M2 | OXYGEN SATURATION: 98 %

## 2021-09-24 DIAGNOSIS — S86.011A RUPTURE OF RIGHT ACHILLES TENDON, INITIAL ENCOUNTER: Primary | ICD-10-CM

## 2021-09-24 PROCEDURE — 99203 OFFICE O/P NEW LOW 30 MIN: CPT | Performed by: ORTHOPAEDIC SURGERY

## 2021-09-24 RX ORDER — CEFAZOLIN SODIUM IN 0.9 % NACL 3 G/100 ML
3 INTRAVENOUS SOLUTION, PIGGYBACK (ML) INTRAVENOUS ONCE
Status: CANCELLED | OUTPATIENT
Start: 2021-09-24 | End: 2021-09-24

## 2021-09-24 RX ORDER — CEFAZOLIN SODIUM 2 G/100ML
2 INJECTION, SOLUTION INTRAVENOUS ONCE
Status: CANCELLED | OUTPATIENT
Start: 2021-09-24 | End: 2021-09-24

## 2021-09-24 NOTE — PROGRESS NOTES
"Chief Complaint  Initial Evaluation of the Right Ankle     Subjective      Shereen Alanis presents to [facility] for an evaluation of right ankle. She states she has been having pain in her achilles for some time. She states pain has been ongoing for 5-6 months. She denies any injury to her right ankle. She states the only incident she can recall was when she was walking and felt a small pop in her achilles. This pain was bearable. This was 5-6 months ago. She has been doing therapy during this time which gave her no relief. She states she has been seeing a podiatrist and they didn't do much treatment. 1 1/2 weeks ago she stepped on a toy and felt her leg give way. She states her leg went numb and felt pain radiate up to her calf. She noticed bruising about the heel. She states Dr. Smith gave her a steroid injection in her ankle on 9/16/21. She states after the injection she felt like her ankle was loose. She went to her PCP and he ordered an MRI after examining her ankle. Severe pain has been 2 weeks. Patient works as CNA.     Allergies   Allergen Reactions   • Sulfa Antibiotics Rash        Social History     Socioeconomic History   • Marital status:      Spouse name: Not on file   • Number of children: Not on file   • Years of education: Not on file   • Highest education level: Not on file   Tobacco Use   • Smoking status: Never Smoker   Vaping Use   • Vaping Use: Never used   Substance and Sexual Activity   • Alcohol use: Yes     Comment: current some day; 3/12/2018- drinks rarely; has been drinking for 31 or more yrs   • Drug use: Never   • Sexual activity: Defer        Review of Systems     Objective   Vital Signs:   Pulse 76   Ht 160 cm (63\")   Wt 68 kg (150 lb)   SpO2 98%   BMI 26.57 kg/m²       Physical Exam  Constitutional:       Appearance: Normal appearance. Patient is well-developed and normal weight.   HENT:      Head: Normocephalic.      Right Ear: Hearing and " external ear normal.      Left Ear: Hearing and external ear normal.      Nose: Nose normal.   Eyes:      Conjunctiva/sclera: Conjunctivae normal.   Cardiovascular:      Rate and Rhythm: Normal rate.   Pulmonary:      Effort: Pulmonary effort is normal.      Breath sounds: No wheezing or rales.   Abdominal:      Palpations: Abdomen is soft.      Tenderness: There is no abdominal tenderness.   Musculoskeletal:      Cervical back: Normal range of motion.   Skin:     Findings: No rash.   Neurological:      Mental Status: Patient  is alert and oriented to person, place, and time.   Psychiatric:         Mood and Affect: Mood and affect normal.         Judgment: Judgment normal.       Ortho Exam      RIGHT ANKLE: Positive kinsey's. Achilles 2.6 cm retraction. Tenderness. Moderate swelling. Bruising about the foot. Ambulation with a boot. Patient able to wiggle toes. Skin intact. Sensation grossly intact. Neurovascular intact.  Dorsal Pedal Pulse 2+, posterior tibialis pulse 2+. Full knee flexion and extension. Palpable defect at the calcaneal insertion.       Procedures        Imaging Results (Most Recent)     None           Result Review :       MRI Ankle Right Without Contrast    Result Date: 9/20/2021  Narrative: PROCEDURE: MRI ANKLE RIGHT WO CONTRAST  COMPARISON: None  INDICATIONS: Chronic pain in the achilles tendon area for about 5 months. No known injury     TECHNIQUE: A complete multi-planar examination was performed without contrast.  FINDINGS:  The distal Achilles tendon at the calcaneal insertion is completely torn and retracted 2.6 cm.  Moderate Achilles tendinopathy is noted.  There is moderate soft tissue edema surrounding the ankle joint.  No fracture or malalignment is seen.  Marrow signal appears normal.  The other tendons appear unremarkable.  The anterior talofibular ligament is torn.  The other ankle ligaments are intact.  Cartilage in the tibiotalar and subtalar joints is intact.  No tibiotalar or  subtalar joint effusion is present.  No loose body is seen.  The sinus tarsi appears normal.  There is mild thickening of the plantar fascia consistent with a history of plantar fasciitis.   CONCLUSION:  1. Complete tear of the distal Achilles tendon with 2.6 cm tendon retraction 2. Moderate Achilles tendinopathy 3. Moderate soft tissue edema surrounding the ankle joint. 4. Previous tear of the anterior talofibular ligament     Phi Calles M.D.       Electronically Signed and Approved By: Phi Calles M.D. on 9/20/2021 at 10:41                   Assessment and Plan     DX: Right achilles tendon rupture     Patient tore her Achilles tendon off the bone. We discussed surgical intervention. We discussed casting as well but recommend surgical intervention. She wishes to proceed with a right Achilles tendon repair. She will continue the boot. We will proceed with surgery on Tuesday.     Discussed surgery., Risks/benefits discussed with patient including, but not limited to: infection, bleeding, neurovascular damage, re-rupture, aesthetic deformity, need for further surgery, and death., Surgery pamphlet given. and Call or return if worsening symptoms.    Follow Up     Post-operatively.       Patient was given instructions and counseling regarding her condition or for health maintenance advice. Please see specific information pulled into the AVS if appropriate.     Scribed for Juan Luis Cody MD by Majo Oconnor.  09/24/21   15:51 EDT    I have personally performed the services described in this document as scribed by the above individual and it is both accurate and complete. Juan Luis Cody MD 09/27/21

## 2021-09-27 RX ORDER — MELOXICAM 15 MG/1
15 TABLET ORAL DAILY
COMMUNITY
End: 2021-10-05

## 2021-09-28 ENCOUNTER — ANESTHESIA EVENT (OUTPATIENT)
Dept: PERIOP | Facility: HOSPITAL | Age: 61
End: 2021-09-28

## 2021-09-28 ENCOUNTER — ANESTHESIA (OUTPATIENT)
Dept: PERIOP | Facility: HOSPITAL | Age: 61
End: 2021-09-28

## 2021-09-28 ENCOUNTER — HOSPITAL ENCOUNTER (OUTPATIENT)
Facility: HOSPITAL | Age: 61
Setting detail: HOSPITAL OUTPATIENT SURGERY
Discharge: HOME | End: 2021-09-28
Attending: ORTHOPAEDIC SURGERY | Admitting: ORTHOPAEDIC SURGERY

## 2021-09-28 VITALS
HEART RATE: 70 BPM | BODY MASS INDEX: 26.68 KG/M2 | TEMPERATURE: 97.6 F | SYSTOLIC BLOOD PRESSURE: 125 MMHG | WEIGHT: 150.57 LBS | DIASTOLIC BLOOD PRESSURE: 71 MMHG | OXYGEN SATURATION: 98 % | HEIGHT: 63 IN | RESPIRATION RATE: 16 BRPM

## 2021-09-28 DIAGNOSIS — S86.011D RUPTURE OF RIGHT ACHILLES TENDON, SUBSEQUENT ENCOUNTER: Primary | ICD-10-CM

## 2021-09-28 DIAGNOSIS — S86.011A RUPTURE OF RIGHT ACHILLES TENDON, INITIAL ENCOUNTER: ICD-10-CM

## 2021-09-28 LAB — QT INTERVAL: 412 MS

## 2021-09-28 PROCEDURE — 25010000003 MEPERIDINE PER 100 MG: Performed by: NURSE ANESTHETIST, CERTIFIED REGISTERED

## 2021-09-28 PROCEDURE — 25010000002 ONDANSETRON PER 1 MG: Performed by: NURSE ANESTHETIST, CERTIFIED REGISTERED

## 2021-09-28 PROCEDURE — 25010000002 FENTANYL CITRATE (PF) 50 MCG/ML SOLUTION: Performed by: NURSE ANESTHETIST, CERTIFIED REGISTERED

## 2021-09-28 PROCEDURE — 93005 ELECTROCARDIOGRAM TRACING: CPT | Performed by: ANESTHESIOLOGY

## 2021-09-28 PROCEDURE — 25010000002 MIDAZOLAM PER 1MG: Performed by: ANESTHESIOLOGY

## 2021-09-28 PROCEDURE — 25010000002 METOCLOPRAMIDE PER 10 MG: Performed by: ANESTHESIOLOGY

## 2021-09-28 PROCEDURE — C1713 ANCHOR/SCREW BN/BN,TIS/BN: HCPCS | Performed by: ORTHOPAEDIC SURGERY

## 2021-09-28 PROCEDURE — 25010000002 DEXAMETHASONE PER 1 MG: Performed by: NURSE ANESTHETIST, CERTIFIED REGISTERED

## 2021-09-28 PROCEDURE — 27650 REPAIR ACHILLES TENDON: CPT | Performed by: ORTHOPAEDIC SURGERY

## 2021-09-28 PROCEDURE — 25010000002 PROPOFOL 10 MG/ML EMULSION: Performed by: NURSE ANESTHETIST, CERTIFIED REGISTERED

## 2021-09-28 PROCEDURE — 93010 ELECTROCARDIOGRAM REPORT: CPT | Performed by: INTERNAL MEDICINE

## 2021-09-28 PROCEDURE — 25010000003 CEFAZOLIN IN DEXTROSE 2-4 GM/100ML-% SOLUTION: Performed by: ORTHOPAEDIC SURGERY

## 2021-09-28 PROCEDURE — 25010000002 HYDROMORPHONE 1 MG/ML SOLUTION: Performed by: NURSE ANESTHETIST, CERTIFIED REGISTERED

## 2021-09-28 DEVICE — SYS SUT/ANCH ACHILLES SPEEDBRIDGE BIOCOMP W/JUMPSTART: Type: IMPLANTABLE DEVICE | Site: ACHILLES TENDON | Status: FUNCTIONAL

## 2021-09-28 RX ORDER — ONDANSETRON 2 MG/ML
INJECTION INTRAMUSCULAR; INTRAVENOUS AS NEEDED
Status: DISCONTINUED | OUTPATIENT
Start: 2021-09-28 | End: 2021-09-28 | Stop reason: SURG

## 2021-09-28 RX ORDER — OXYCODONE HYDROCHLORIDE 5 MG/1
5 TABLET ORAL
Status: DISCONTINUED | OUTPATIENT
Start: 2021-09-28 | End: 2021-09-28 | Stop reason: HOSPADM

## 2021-09-28 RX ORDER — ROCURONIUM BROMIDE 10 MG/ML
INJECTION, SOLUTION INTRAVENOUS AS NEEDED
Status: DISCONTINUED | OUTPATIENT
Start: 2021-09-28 | End: 2021-09-28 | Stop reason: SURG

## 2021-09-28 RX ORDER — SODIUM CHLORIDE, SODIUM LACTATE, POTASSIUM CHLORIDE, CALCIUM CHLORIDE 600; 310; 30; 20 MG/100ML; MG/100ML; MG/100ML; MG/100ML
9 INJECTION, SOLUTION INTRAVENOUS CONTINUOUS PRN
Status: DISCONTINUED | OUTPATIENT
Start: 2021-09-28 | End: 2021-09-28 | Stop reason: HOSPADM

## 2021-09-28 RX ORDER — SCOLOPAMINE TRANSDERMAL SYSTEM 1 MG/1
1 PATCH, EXTENDED RELEASE TRANSDERMAL CONTINUOUS
Status: DISCONTINUED | OUTPATIENT
Start: 2021-09-28 | End: 2021-09-28 | Stop reason: HOSPADM

## 2021-09-28 RX ORDER — PROMETHAZINE HYDROCHLORIDE 25 MG/1
25 SUPPOSITORY RECTAL ONCE AS NEEDED
Status: DISCONTINUED | OUTPATIENT
Start: 2021-09-28 | End: 2021-09-28 | Stop reason: HOSPADM

## 2021-09-28 RX ORDER — LIDOCAINE HYDROCHLORIDE 20 MG/ML
INJECTION, SOLUTION INFILTRATION; PERINEURAL AS NEEDED
Status: DISCONTINUED | OUTPATIENT
Start: 2021-09-28 | End: 2021-09-28 | Stop reason: SURG

## 2021-09-28 RX ORDER — PROPOFOL 10 MG/ML
VIAL (ML) INTRAVENOUS AS NEEDED
Status: DISCONTINUED | OUTPATIENT
Start: 2021-09-28 | End: 2021-09-28 | Stop reason: SURG

## 2021-09-28 RX ORDER — PHENYLEPHRINE HCL IN 0.9% NACL 1 MG/10 ML
SYRINGE (ML) INTRAVENOUS AS NEEDED
Status: DISCONTINUED | OUTPATIENT
Start: 2021-09-28 | End: 2021-09-28 | Stop reason: SURG

## 2021-09-28 RX ORDER — ONDANSETRON 2 MG/ML
4 INJECTION INTRAMUSCULAR; INTRAVENOUS ONCE AS NEEDED
Status: DISCONTINUED | OUTPATIENT
Start: 2021-09-28 | End: 2021-09-28 | Stop reason: HOSPADM

## 2021-09-28 RX ORDER — PROMETHAZINE HYDROCHLORIDE 12.5 MG/1
25 TABLET ORAL ONCE AS NEEDED
Status: DISCONTINUED | OUTPATIENT
Start: 2021-09-28 | End: 2021-09-28 | Stop reason: HOSPADM

## 2021-09-28 RX ORDER — METOCLOPRAMIDE HYDROCHLORIDE 5 MG/ML
10 INJECTION INTRAMUSCULAR; INTRAVENOUS ONCE AS NEEDED
Status: COMPLETED | OUTPATIENT
Start: 2021-09-28 | End: 2021-09-28

## 2021-09-28 RX ORDER — MIDAZOLAM HYDROCHLORIDE 2 MG/2ML
2 INJECTION, SOLUTION INTRAMUSCULAR; INTRAVENOUS ONCE
Status: COMPLETED | OUTPATIENT
Start: 2021-09-28 | End: 2021-09-28

## 2021-09-28 RX ORDER — ACETAMINOPHEN 500 MG
1000 TABLET ORAL ONCE
Status: COMPLETED | OUTPATIENT
Start: 2021-09-28 | End: 2021-09-28

## 2021-09-28 RX ORDER — DEXAMETHASONE SODIUM PHOSPHATE 4 MG/ML
INJECTION, SOLUTION INTRA-ARTICULAR; INTRALESIONAL; INTRAMUSCULAR; INTRAVENOUS; SOFT TISSUE AS NEEDED
Status: DISCONTINUED | OUTPATIENT
Start: 2021-09-28 | End: 2021-09-28 | Stop reason: SURG

## 2021-09-28 RX ORDER — HYDROCODONE BITARTRATE AND ACETAMINOPHEN 7.5; 325 MG/1; MG/1
1 TABLET ORAL EVERY 4 HOURS PRN
Qty: 30 TABLET | Refills: 0 | Status: SHIPPED | OUTPATIENT
Start: 2021-09-28 | End: 2022-02-28

## 2021-09-28 RX ORDER — FENTANYL CITRATE 50 UG/ML
INJECTION, SOLUTION INTRAMUSCULAR; INTRAVENOUS AS NEEDED
Status: DISCONTINUED | OUTPATIENT
Start: 2021-09-28 | End: 2021-09-28 | Stop reason: SURG

## 2021-09-28 RX ORDER — MEPERIDINE HYDROCHLORIDE 25 MG/ML
12.5 INJECTION INTRAMUSCULAR; INTRAVENOUS; SUBCUTANEOUS
Status: DISCONTINUED | OUTPATIENT
Start: 2021-09-28 | End: 2021-09-28 | Stop reason: HOSPADM

## 2021-09-28 RX ORDER — CEFAZOLIN SODIUM 2 G/100ML
2 INJECTION, SOLUTION INTRAVENOUS ONCE
Status: COMPLETED | OUTPATIENT
Start: 2021-09-28 | End: 2021-09-28

## 2021-09-28 RX ORDER — CEFAZOLIN SODIUM IN 0.9 % NACL 3 G/100 ML
3 INTRAVENOUS SOLUTION, PIGGYBACK (ML) INTRAVENOUS ONCE
Status: DISCONTINUED | OUTPATIENT
Start: 2021-09-28 | End: 2021-09-28 | Stop reason: DRUGHIGH

## 2021-09-28 RX ORDER — MAGNESIUM HYDROXIDE 1200 MG/15ML
LIQUID ORAL AS NEEDED
Status: DISCONTINUED | OUTPATIENT
Start: 2021-09-28 | End: 2021-09-28 | Stop reason: HOSPADM

## 2021-09-28 RX ORDER — OXYCODONE HYDROCHLORIDE AND ACETAMINOPHEN 5; 325 MG/1; MG/1
1 TABLET ORAL ONCE AS NEEDED
Status: DISCONTINUED | OUTPATIENT
Start: 2021-09-28 | End: 2021-09-28 | Stop reason: HOSPADM

## 2021-09-28 RX ADMIN — MIDAZOLAM HYDROCHLORIDE 2 MG: 1 INJECTION, SOLUTION INTRAMUSCULAR; INTRAVENOUS at 13:56

## 2021-09-28 RX ADMIN — Medication 50 MCG: at 14:41

## 2021-09-28 RX ADMIN — PROPOFOL 160 MG: 10 INJECTION, EMULSION INTRAVENOUS at 14:35

## 2021-09-28 RX ADMIN — DEXAMETHASONE SODIUM PHOSPHATE 4 MG: 4 INJECTION INTRA-ARTICULAR; INTRALESIONAL; INTRAMUSCULAR; INTRAVENOUS; SOFT TISSUE at 14:53

## 2021-09-28 RX ADMIN — METOCLOPRAMIDE HYDROCHLORIDE 10 MG: 5 INJECTION INTRAMUSCULAR; INTRAVENOUS at 13:54

## 2021-09-28 RX ADMIN — ONDANSETRON 4 MG: 2 INJECTION INTRAMUSCULAR; INTRAVENOUS at 14:53

## 2021-09-28 RX ADMIN — ONDANSETRON 4 MG: 2 INJECTION INTRAMUSCULAR; INTRAVENOUS at 17:16

## 2021-09-28 RX ADMIN — SUGAMMADEX 200 MG: 100 INJECTION, SOLUTION INTRAVENOUS at 15:31

## 2021-09-28 RX ADMIN — MEPERIDINE HYDROCHLORIDE 12.5 MG: 25 INJECTION INTRAMUSCULAR; INTRAVENOUS; SUBCUTANEOUS at 16:14

## 2021-09-28 RX ADMIN — LIDOCAINE HYDROCHLORIDE 50 MG: 20 INJECTION, SOLUTION INFILTRATION; PERINEURAL at 14:35

## 2021-09-28 RX ADMIN — CEFAZOLIN SODIUM 2 G: 2 INJECTION, SOLUTION INTRAVENOUS at 13:55

## 2021-09-28 RX ADMIN — ACETAMINOPHEN 1000 MG: 500 TABLET ORAL at 09:19

## 2021-09-28 RX ADMIN — FENTANYL CITRATE 100 MCG: 50 INJECTION INTRAMUSCULAR; INTRAVENOUS at 14:35

## 2021-09-28 RX ADMIN — SODIUM CHLORIDE, POTASSIUM CHLORIDE, SODIUM LACTATE AND CALCIUM CHLORIDE 9 ML/HR: 600; 310; 30; 20 INJECTION, SOLUTION INTRAVENOUS at 09:19

## 2021-09-28 RX ADMIN — ROCURONIUM BROMIDE 50 MG: 10 INJECTION INTRAVENOUS at 14:35

## 2021-09-28 RX ADMIN — HYDROMORPHONE HYDROCHLORIDE 0.5 MG: 1 INJECTION, SOLUTION INTRAMUSCULAR; INTRAVENOUS; SUBCUTANEOUS at 15:48

## 2021-09-28 RX ADMIN — HYDROMORPHONE HYDROCHLORIDE 0.5 MG: 1 INJECTION, SOLUTION INTRAMUSCULAR; INTRAVENOUS; SUBCUTANEOUS at 15:58

## 2021-09-28 RX ADMIN — Medication 50 MCG: at 14:47

## 2021-09-28 RX ADMIN — OXYCODONE HYDROCHLORIDE 5 MG: 5 TABLET ORAL at 16:56

## 2021-09-28 RX ADMIN — SCOPALAMINE 1 PATCH: 1 PATCH, EXTENDED RELEASE TRANSDERMAL at 09:19

## 2021-09-28 NOTE — ANESTHESIA PREPROCEDURE EVALUATION
Anesthesia Evaluation     Patient summary reviewed and Nursing notes reviewed   no history of anesthetic complications:  NPO Solid Status: > 8 hours  NPO Liquid Status: > 2 hours           Airway   Mallampati: I  TM distance: >3 FB  Neck ROM: full  No difficulty expected  Dental      Pulmonary - negative pulmonary ROS and normal exam    breath sounds clear to auscultation  Cardiovascular - normal exam  Exercise tolerance: good (4-7 METS)    Rhythm: regular    (+) hyperlipidemia,       Neuro/Psych- negative ROS  GI/Hepatic/Renal/Endo - negative ROS     Musculoskeletal     Abdominal    Substance History - negative use     OB/GYN negative ob/gyn ROS         Other   arthritis,                      Anesthesia Plan    ASA 2     general   (Patient understands anesthesia not responsible for dental damage.)  intravenous induction     Anesthetic plan, all risks, benefits, and alternatives have been provided, discussed and informed consent has been obtained with: patient.  Use of blood products discussed with patient .   Plan discussed with CRNA.

## 2021-09-28 NOTE — ANESTHESIA POSTPROCEDURE EVALUATION
Patient: Shereen Alanis    Procedure Summary     Date: 09/28/21 Room / Location: [facility] OR 01 / [facility] MAIN OR    Anesthesia Start: 1431 Anesthesia Stop: 1538    Procedure: ACHILLES TENDON REPAIR, RIGHT (Right Ankle) Diagnosis:       Rupture of right Achilles tendon, initial encounter      (Rupture of right Achilles tendon, initial encounter [S86.011A])    Surgeons: Juan Luis Cody MD Provider: Harjit Ontiveros MD    Anesthesia Type: general ASA Status: 2          Anesthesia Type: general    Vitals  Vitals Value Taken Time   BP     Temp     Pulse 67 09/28/21 1541   Resp     SpO2 100 % 09/28/21 1541   Vitals shown include unvalidated device data.        Post Anesthesia Care and Evaluation    Patient location during evaluation: bedside  Patient participation: complete - patient participated  Level of consciousness: awake and alert  Pain management: adequate  Airway patency: patent  Anesthetic complications: No anesthetic complications  PONV Status: none  Cardiovascular status: acceptable  Respiratory status: acceptable  Hydration status: acceptable

## 2021-10-05 ENCOUNTER — OFFICE VISIT (OUTPATIENT)
Dept: FAMILY MEDICINE CLINIC | Facility: CLINIC | Age: 61
End: 2021-10-05

## 2021-10-05 VITALS
BODY MASS INDEX: 26.45 KG/M2 | SYSTOLIC BLOOD PRESSURE: 110 MMHG | HEART RATE: 100 BPM | WEIGHT: 149.3 LBS | HEIGHT: 63 IN | TEMPERATURE: 98.3 F | DIASTOLIC BLOOD PRESSURE: 72 MMHG | OXYGEN SATURATION: 96 %

## 2021-10-05 DIAGNOSIS — M25.571 CHRONIC PAIN OF RIGHT ANKLE: Primary | ICD-10-CM

## 2021-10-05 DIAGNOSIS — J30.9 ALLERGIC RHINITIS, UNSPECIFIED SEASONALITY, UNSPECIFIED TRIGGER: ICD-10-CM

## 2021-10-05 DIAGNOSIS — M67.88 ACHILLES TENDINOSIS OF LEFT LOWER EXTREMITY: ICD-10-CM

## 2021-10-05 DIAGNOSIS — S86.011S: ICD-10-CM

## 2021-10-05 DIAGNOSIS — G89.29 CHRONIC PAIN OF RIGHT ANKLE: Primary | ICD-10-CM

## 2021-10-05 PROCEDURE — 99213 OFFICE O/P EST LOW 20 MIN: CPT | Performed by: FAMILY MEDICINE

## 2021-10-05 RX ORDER — FLUTICASONE PROPIONATE 50 MCG
2 SPRAY, SUSPENSION (ML) NASAL DAILY
Qty: 42 G | Refills: 3 | Status: SHIPPED | OUTPATIENT
Start: 2021-10-05 | End: 2022-05-31 | Stop reason: SDUPTHER

## 2021-10-05 RX ORDER — IBUPROFEN 600 MG/1
600 TABLET ORAL EVERY 8 HOURS PRN
Qty: 90 TABLET | Refills: 1 | Status: SHIPPED | OUTPATIENT
Start: 2021-10-05 | End: 2022-01-24 | Stop reason: SDUPTHER

## 2021-10-05 NOTE — PROGRESS NOTES
"Chief Complaint  Achilles tendon rupture, right    Subjective          Shereen Alanis presents to [facility]  History of Present Illness  Patient presents today for follow-up after having surgery with Dr. Cody for rupture of the right Achilles tendon.  She was seen on 9/24/2021.  She had surgery on 9/28/2021.  I had an MRI done of her right ankle due to persistent symptoms.  There was a complete tear of the distal Achilles tendon with 2.6 cm tendon retraction.  She does report having a corticosteroid injection a few days prior to having the MRI completed.  When I saw her on 8/31/2021 she had a negative Ferguson's test at that time.  Patient overall doing better since surgery.  She is taking Norco as needed for breakthrough pain but is requesting ibuprofen.  Norco was prescribed by Dr. Cody.  She does need a refill of Flonase that she takes for allergic rhinitis which does help out.  Objective   Vital Signs:   /72   Pulse 100   Temp 98.3 °F (36.8 °C)   Ht 160 cm (63\")   Wt 67.7 kg (149 lb 4.8 oz)   SpO2 96%   BMI 26.45 kg/m²     Physical Exam   General: AAO ×3, no acute distress, pleasant  HEENT: Normocephalic, atraumatic  Cardiovascular: Regular rate and rhythm without appreciable murmur  Respiratory: Clear to auscultation bilaterally no RRW  Gastrointestinal: Soft nontender nondistended with bowel sounds present  extremities: No edema.  Dressing in place over the right lower extremity.  Neurologic: CN II through XII grossly intact   Psychiatric: Normal mood and affect  Result Review :                 Assessment and Plan    Diagnoses and all orders for this visit:    1. Chronic pain of right ankle (Primary)    2. Complete rupture of Achilles tendon, right, sequela    3. Achilles tendinosis of left lower extremity    4. Allergic rhinitis, unspecified seasonality, unspecified trigger    Other orders  -     ibuprofen (ADVIL,MOTRIN) 600 MG tablet; Take 1 tablet by mouth Every " 8 (Eight) Hours As Needed for Moderate Pain .  Dispense: 90 tablet; Refill: 1  -     fluticasone (FLONASE) 50 MCG/ACT nasal spray; 2 sprays into the nostril(s) as directed by provider Daily.  Dispense: 42 g; Refill: 3    Patient had a complete rupture of the Achilles tendon on the right status post surgical repair on 9/28/2021.  Encouraged her to keep follow-up with orthopedics.  She does have issues with Achilles tendinosis of the left lower extremity and is still seeing podiatry.  I have refilled her Flonase today for treatment of allergic rhinitis.  I will see patient back in 2 months or sooner if needed.  Patient struck to call with any questions or concerns.    Follow Up   Return in about 2 months (around 12/5/2021) for achilles tendon pain, bilateral.  Patient was given instructions and counseling regarding her condition or for health maintenance advice. Please see specific information pulled into the AVS if appropriate.

## 2021-10-11 ENCOUNTER — OFFICE VISIT (OUTPATIENT)
Dept: ORTHOPEDIC SURGERY | Facility: CLINIC | Age: 61
End: 2021-10-11

## 2021-10-11 VITALS — HEIGHT: 63 IN | BODY MASS INDEX: 26.58 KG/M2 | OXYGEN SATURATION: 98 % | WEIGHT: 150 LBS | HEART RATE: 91 BPM

## 2021-10-11 DIAGNOSIS — Z47.89 AFTERCARE FOLLOWING SURGERY OF THE MUSCULOSKELETAL SYSTEM: Primary | ICD-10-CM

## 2021-10-11 DIAGNOSIS — Z98.890 S/P ACHILLES TENDON REPAIR: ICD-10-CM

## 2021-10-11 PROCEDURE — 29405 APPL SHORT LEG CAST: CPT | Performed by: PHYSICIAN ASSISTANT

## 2021-10-11 PROCEDURE — 99024 POSTOP FOLLOW-UP VISIT: CPT | Performed by: PHYSICIAN ASSISTANT

## 2021-10-11 RX ORDER — CYCLOBENZAPRINE HCL 10 MG
10 TABLET ORAL 3 TIMES DAILY PRN
Qty: 30 TABLET | Refills: 0 | Status: SHIPPED | OUTPATIENT
Start: 2021-10-11

## 2021-10-11 NOTE — ASSESSMENT & PLAN NOTE
Staples/sutures removed in clinic today.  Patient placed into a neutral well-padded short leg cast.  She will continue use of crutches until her knee scooter comes in.  Discussed with the patient she is allowed to toe-touch weight-bear.  Rest ice and elevate.  Patient is going to travel to see her granddaughter soon.  We will follow up in 3 weeks for recheck and the patient will be transition into a walking boot at that time.

## 2021-10-11 NOTE — PROGRESS NOTES
"Chief Complaint  Pain of the Right Ankle    Subjective          Shereen Alanis presents to [facility] for follow-up on right ankle status post right Achilles tendon repair performed by Dr. Cody 9/28/2021.  Patient is doing well, presents in a postop splint using crutches for ambulation.  States pain is well controlled.  She does state that she is going to be traveling to see her granddaughter this week.    Objective   Allergies   Allergen Reactions   • Sulfa Antibiotics Rash       Vital Signs:   Pulse 91   Ht 160 cm (63\")   Wt 68 kg (150 lb)   SpO2 98%   BMI 26.57 kg/m²       Physical Exam  Constitutional:       Appearance: Normal appearance. Patient is well-developed and normal weight.   HENT:      Head: Normocephalic.      Right Ear: Hearing and external ear normal.      Left Ear: Hearing and external ear normal.      Nose: Nose normal.   Eyes:      Conjunctiva/sclera: Conjunctivae normal.   Cardiovascular:      Rate and Rhythm: Normal rate.   Pulmonary:      Effort: Pulmonary effort is normal.      Breath sounds: No wheezing or rales.   Abdominal:      Palpations: Abdomen is soft.      Tenderness: There is no abdominal tenderness.   Musculoskeletal:      Cervical back: Normal range of motion.   Skin:     Findings: No rash.   Neurological:      Mental Status: Patient is alert and oriented to person, place, and time.   Psychiatric:         Mood and Affect: Mood and affect normal.         Judgment: Judgment normal.     Ortho Exam  Right ankle: Well-healing surgical incisions without erythema dehiscence or purulent drainage, mild soft tissue swelling about the ankle, negative Ferguson test, able to wiggle all digits, sensation light touch intact and posterior tib pulses 2+ gait not tested due to recent procedure.  Result Review :            Imaging Results (Most Recent)     None         Orthopedic Injury Treatment    Date/Time: 10/11/2021 10:04 AM  Performed by: Subha Wood, " PA  Authorized by: Subha Wood PA   Injury location: ankle  Location details: right ankle  Pre-procedure neurovascular assessment: neurovascularly intact    Anesthesia:  Local anesthesia used: no    Sedation:  Patient sedated: no    Immobilization: cast (short leg)  Supplies used: Fiberglass.  Post-procedure neurovascular assessment: post-procedure neurovascularly intact  Patient tolerance: patient tolerated the procedure well with no immediate complications  Comments: Patient was placed in fiberglass cast today.  The patient tolerated the procedure without any complications.  Applied by Sydnie Colby [facility]             Assessment and Plan    Problem List Items Addressed This Visit        Musculoskeletal and Injuries    S/P Achilles tendon repair    Current Assessment & Plan     Staples/sutures removed in clinic today.  Patient placed into a neutral well-padded short leg cast.  She will continue use of crutches until her knee scooter comes in.  Discussed with the patient she is allowed to toe-touch weight-bear.  Rest ice and elevate.  Patient is going to travel to see her granddaughter soon.  We will follow up in 3 weeks for recheck and the patient will be transition into a walking boot at that time.         RESOLVED: Aftercare following surgery of the left Achilles tendon repair - Primary    Current Assessment & Plan     Staples/sutures removed in clinic today.  Patient placed into a neutral well-padded short leg cast.  She will continue use of crutches until her knee scooter comes in.  Discussed with the patient she is allowed to toe-touch weight-bear.  Rest ice and elevate.  Patient is going to travel to see her granddaughter soon.  We will follow up in 3 weeks for recheck and the patient will be transition into a walking boot at that time.         Relevant Orders    Miscellaneous DME          Follow Up   Return in about 3 weeks (around 11/1/2021) for Recheck.  Patient Instructions   Staples/sutures removed in  clinic today.  Patient placed into a neutral well-padded short leg cast.  She will continue use of crutches until her knee scooter comes in.  Discussed with the patient she is allowed to toe-touch weight-bear.  Rest ice and elevate.  Patient is going to travel to see her granddaughter soon.  We will follow up in 3 weeks for recheck and the patient will be transition into a walking boot at that time.    Patient was given instructions and counseling regarding her condition or for health maintenance advice. Please see specific information pulled into the AVS if appropriate.

## 2021-10-21 ENCOUNTER — TELEPHONE (OUTPATIENT)
Dept: ORTHOPEDIC SURGERY | Facility: CLINIC | Age: 61
End: 2021-10-21

## 2021-10-21 NOTE — TELEPHONE ENCOUNTER
DELETE AFTER REVIEWING: Telephone encounter to be sent to the clinical pool   Hub staff attempted to follow warm transfer process and was unsuccessful     Caller: Shereen Alanis A    Relationship to patient: Self    Best call back number:[phone]    Patient is needing: PATIENT IS IN [place]  AND WOULD LIKE A CALL BACK TO SEE IF SHE CAN HAVE THE CAST TAKEN OFF WHILE SHE IS THERE OR WAIT UNTIL SHE COMES BACK (11/13)      10/21/2021 3:04 PM: I called the patient on her mobile phone.  We spoke regarding her question that she sent me.  At her last visit 10/11/2021 patient was placed in a well-padded neutral short leg cast and instructed to return in 3 weeks to have the cast removed and go into a walking boot.  She made a follow-up appointment for 11/8/2021 but states she is in [place] until Saturday 11/13/2021.  She is wondering if she should have the cast removed and go into a walking boot on 11/8/2021 as planned while she is in [place] or if she should just wait until she comes back to [place] and follow-up with us 11/15/2021 to have cast removed.  I discussed this patient with Dr. Ramos, as Dr. Cody was not in the office today, and we will keep the patient in her current cast until she is able to follow-up Monday, 11/15/2021.  I discussed with the patient she should call our office for any new or worsening symptoms or any questions or concerns.  She is planning to call the hub to reschedule from 11/8/2021 to 11/15/2021 which is when she will be back in town.

## 2021-11-15 ENCOUNTER — OFFICE VISIT (OUTPATIENT)
Dept: ORTHOPEDIC SURGERY | Facility: CLINIC | Age: 61
End: 2021-11-15

## 2021-11-15 VITALS — WEIGHT: 150 LBS | HEIGHT: 63 IN | BODY MASS INDEX: 26.58 KG/M2

## 2021-11-15 DIAGNOSIS — Z98.890 S/P ACHILLES TENDON REPAIR: ICD-10-CM

## 2021-11-15 DIAGNOSIS — Z47.89 AFTERCARE FOLLOWING SURGERY OF THE MUSCULOSKELETAL SYSTEM: Primary | ICD-10-CM

## 2021-11-15 PROBLEM — Z96.652 AFTERCARE FOLLOWING LEFT KNEE JOINT REPLACEMENT SURGERY: Status: RESOLVED | Noted: 2018-04-09 | Resolved: 2021-11-15

## 2021-11-15 PROBLEM — Z47.1 AFTERCARE FOLLOWING LEFT KNEE JOINT REPLACEMENT SURGERY: Status: RESOLVED | Noted: 2018-04-09 | Resolved: 2021-11-15

## 2021-11-15 PROCEDURE — 99024 POSTOP FOLLOW-UP VISIT: CPT | Performed by: PHYSICIAN ASSISTANT

## 2021-11-15 NOTE — PROGRESS NOTES
"Chief Complaint  Pain of the Right Ankle    Subjective          Shereen Alanis presents to [facility] for follow-up on right ankle status post right Achilles tendon repair performed by Dr. Cody 9/28/2021.  She presents today in a short leg cast using a knee scooter for ambulation.  She states pain is well controlled, she had some pain around the heel while in the cast but overall is doing well.  She is remained nonweightbearing.    Objective   Allergies   Allergen Reactions   • Sulfa Antibiotics Rash       Vital Signs:   Ht 160 cm (63\")   Wt 68 kg (150 lb)   BMI 26.57 kg/m²       Physical Exam  Constitutional:       Appearance: Normal appearance. Patient is well-developed and normal weight.   HENT:      Head: Normocephalic.      Right Ear: Hearing and external ear normal.      Left Ear: Hearing and external ear normal.      Nose: Nose normal.   Eyes:      Conjunctiva/sclera: Conjunctivae normal.   Cardiovascular:      Rate and Rhythm: Normal rate.   Pulmonary:      Effort: Pulmonary effort is normal.      Breath sounds: No wheezing or rales.   Abdominal:      Palpations: Abdomen is soft.      Tenderness: There is no abdominal tenderness.   Musculoskeletal:      Cervical back: Normal range of motion.   Skin:     Findings: No rash.   Neurological:      Mental Status: Patient is alert and oriented to person, place, and time.   Psychiatric:         Mood and Affect: Mood and affect normal.         Judgment: Judgment normal.     Ortho Exam  Right ankle: Well-healing surgical incision, there is no erythema dehiscence or purulent drainage, overlying dry skin, Achilles tendon intact, negative Ferguson test, good plantar and dorsiflexion, sensation light touch intact, dorsalis pedis pulses 2+, able to wiggle all digits, gait not tested.  Result Review :            Imaging Results (Most Recent)     None                Assessment and Plan    Problem List Items Addressed This Visit        " Musculoskeletal and Injuries    S/P Achilles tendon repair    Relevant Orders    Ambulatory Referral to Physical Therapy Evaluate and treat, POST OP; Full weight bearing (with boot) (Completed)    Aftercare following surgery of the right Achilles tendon repair - Primary    Current Assessment & Plan     Patient is doing well, she will begin using a walking boot, short leg cast removed.  She will begin weightbearing as tolerated.  Recommend she begins outpatient physical therapy and home exercises.  We will follow up in 4 weeks for recheck with no x-rays at that time.               Follow Up   Return in about 4 weeks (around 12/13/2021) for Recheck.  Patient Instructions   Patient is doing well, she will begin using a walking boot, short leg cast removed.  She will begin weightbearing as tolerated.  Recommend she begins outpatient physical therapy and home exercises.  We will follow up in 4 weeks for recheck with no x-rays at that time.    Patient was given instructions and counseling regarding her condition or for health maintenance advice. Please see specific information pulled into the AVS if appropriate.

## 2021-11-15 NOTE — ASSESSMENT & PLAN NOTE
Patient is doing well, she will begin using a walking boot, short leg cast removed.  She will begin weightbearing as tolerated.  Recommend she begins outpatient physical therapy and home exercises.  We will follow up in 4 weeks for recheck with no x-rays at that time.

## 2021-11-16 ENCOUNTER — OFFICE VISIT (OUTPATIENT)
Dept: FAMILY MEDICINE CLINIC | Facility: CLINIC | Age: 61
End: 2021-11-16

## 2021-11-16 VITALS
BODY MASS INDEX: 26.72 KG/M2 | HEART RATE: 79 BPM | TEMPERATURE: 97.9 F | WEIGHT: 150.8 LBS | OXYGEN SATURATION: 99 % | HEIGHT: 63 IN | DIASTOLIC BLOOD PRESSURE: 72 MMHG | SYSTOLIC BLOOD PRESSURE: 106 MMHG

## 2021-11-16 DIAGNOSIS — M79.642 HAND PAIN, LEFT: Primary | ICD-10-CM

## 2021-11-16 DIAGNOSIS — M79.641 HAND PAIN, RIGHT: ICD-10-CM

## 2021-11-16 DIAGNOSIS — M67.88 ACHILLES TENDINOSIS OF LEFT LOWER EXTREMITY: ICD-10-CM

## 2021-11-16 DIAGNOSIS — S86.011S: ICD-10-CM

## 2021-11-16 PROCEDURE — 99213 OFFICE O/P EST LOW 20 MIN: CPT | Performed by: FAMILY MEDICINE

## 2021-11-16 NOTE — PROGRESS NOTES
"Chief Complaint  Bilateral hand pain    Subjective          Shereen Alanis presents to [facility]  History of Present Illness  Patient presents today to discuss bilateral hand pain.  She points to pain over the first CMC joints bilaterally.  She has had this issue on and off for several years.  Is worse in the wintertime.  She denies any numbness tingling or radiation of pain.  She points to pain mainly at the first CMC joints bilaterally.  She denies any recent injury to this area.  She is status post Achilles tendon repair done on 9/28/2021 with Dr. Cody.  She reports that she is still recovering from this.  She is doing physical therapy.  She does have Achilles tendinosis of the left lower extremity.  She does not want any further evaluation at this time.  She does get physical therapy for the left as well.  Objective   Vital Signs:   /72   Pulse 79   Temp 97.9 °F (36.6 °C)   Ht 160 cm (63\")   Wt 68.4 kg (150 lb 12.8 oz)   SpO2 99%   BMI 26.71 kg/m²     Physical Exam   General: AAO ×3, no acute distress, pleasant  HEENT: Normocephalic, atraumatic  Musculoskeletal: Tenderness to palpation of the first CMC joints bilaterally.  No joint deformity appreciated otherwise in her hands.  extremities: No edema  Neurologic: CN II through XII grossly intact   Psychiatric: Normal mood and affect  Result Review :                 Assessment and Plan    Diagnoses and all orders for this visit:    1. Hand pain, left (Primary)  -     XR Hand 3+ View Left; Future    2. Complete rupture of Achilles tendon, right, sequela    3. Achilles tendinosis of left lower extremity    4. Hand pain, right  -     XR Hand 3+ View Right; Future    Other orders  -     Diclofenac Sodium (VOLTAREN) 1 % gel gel; Apply 4 g topically to the appropriate area as directed 4 (Four) Times a Day As Needed (joint pain).  Dispense: 100 g; Refill: 1    Discussed getting x-rays of her hands for further evaluation.  I " suspect arthritis.  I will give her Voltaren gel.  She will continue with management for Achilles tendon rupture on the right per Dr. Cody.  She is doing physical therapy for the left as well.  I will see her back in 3 months or sooner if needed.  Patient instructed to call with any questions or concerns.    Follow Up   Return in about 3 months (around 2/16/2022) for joint pain.  Patient was given instructions and counseling regarding her condition or for health maintenance advice. Please see specific information pulled into the AVS if appropriate.

## 2021-11-17 ENCOUNTER — HOSPITAL ENCOUNTER (OUTPATIENT)
Dept: GENERAL RADIOLOGY | Facility: HOSPITAL | Age: 61
Discharge: HOME | End: 2021-11-17

## 2021-11-17 DIAGNOSIS — M79.641 HAND PAIN, RIGHT: ICD-10-CM

## 2021-11-17 DIAGNOSIS — M79.642 HAND PAIN, LEFT: ICD-10-CM

## 2021-11-17 PROCEDURE — 73130 X-RAY EXAM OF HAND: CPT

## 2021-12-27 ENCOUNTER — OFFICE VISIT (OUTPATIENT)
Dept: ORTHOPEDIC SURGERY | Facility: CLINIC | Age: 61
End: 2021-12-27

## 2021-12-27 VITALS — OXYGEN SATURATION: 98 % | BODY MASS INDEX: 26.58 KG/M2 | WEIGHT: 150 LBS | HEIGHT: 63 IN | HEART RATE: 71 BPM

## 2021-12-27 DIAGNOSIS — Z47.89 AFTERCARE FOLLOWING SURGERY OF THE MUSCULOSKELETAL SYSTEM: Primary | ICD-10-CM

## 2021-12-27 PROCEDURE — 99024 POSTOP FOLLOW-UP VISIT: CPT | Performed by: PHYSICIAN ASSISTANT

## 2021-12-27 NOTE — PROGRESS NOTES
"Chief Complaint  Follow-up of the Right Ankle    Subjective          Shereen Alanis presents to [facility] for follow-up on right ankle status post right Achilles tendon repair performed by Dr. Cody 9/28/2021.  Presents today using a walking boot for ambulation.  States she is doing well, still has some achiness around the posterior aspect of the ankle near the surgical incision.  Denies any new injury.  PT is going well, she states she is also doing home exercises.  She is going to [facility] in [place].  Takes ibuprofen occasionally.  States she has not had to take Norco in a while.    Objective   Allergies   Allergen Reactions   • Sulfa Antibiotics Rash       Vital Signs:   Pulse 71   Ht 160 cm (63\")   Wt 68 kg (150 lb)   SpO2 98%   BMI 26.57 kg/m²       Physical Exam  Constitutional:       Appearance: Normal appearance. Patient is well-developed and normal weight.   HENT:      Head: Normocephalic.      Right Ear: Hearing and external ear normal.      Left Ear: Hearing and external ear normal.      Nose: Nose normal.   Eyes:      Conjunctiva/sclera: Conjunctivae normal.   Cardiovascular:      Rate and Rhythm: Normal rate.   Pulmonary:      Effort: Pulmonary effort is normal.      Breath sounds: No wheezing or rales.   Abdominal:      Palpations: Abdomen is soft.      Tenderness: There is no abdominal tenderness.   Musculoskeletal:      Cervical back: Normal range of motion.   Skin:     Findings: No rash.   Neurological:      Mental Status: Patient is alert and oriented to person, place, and time.   Psychiatric:         Mood and Affect: Mood and affect normal.         Judgment: Judgment normal.     Ortho Exam  Right ankle with well-healed surgical incision, no erythema dehiscence or purulent drainage, mild tenderness to palpation about the posterior aspect of the ankle, good plantar and dorsiflexion, good inversion eversion, good abduction abduction, negative Ferguson test, gait " nonantalgic, sensation light touch intact, posterior tib pulses 2+, cap refill less than 2 seconds, able to wiggle the digits.  Result Review :            Imaging Results (Most Recent)     None                Assessment and Plan {CC Problem List  Visit Diagnosis  ROS  Review (Popup)  Health Maintenance  Quality  BestPractice  Medications  SmartSets  SnapShot Encounters  Media :23}   Problem List Items Addressed This Visit        Musculoskeletal and Injuries    Aftercare following surgery of the right Achilles tendon repair - Primary    Current Assessment & Plan     Patient is doing well, recommend continuation of PT and home exercises.  Rest ice elevate.  Ibuprofen and Tylenol recommended for pain.  Discontinue walking boot.  Follow-up in 4 to 6 weeks for recheck.               Follow Up   Return in about 4 weeks (around 1/24/2022) for Recheck.  Patient Instructions   Patient is doing well, recommend continuation of PT and home exercises.  Rest ice elevate.  Ibuprofen and Tylenol recommended for pain.  Discontinue walking boot.  Follow-up in 4 to 6 weeks for recheck.    Patient was given instructions and counseling regarding her condition or for health maintenance advice. Please see specific information pulled into the AVS if appropriate.

## 2021-12-27 NOTE — PATIENT INSTRUCTIONS
Patient is doing well, recommend continuation of PT and home exercises.  Rest ice elevate.  Ibuprofen and Tylenol recommended for pain.  Discontinue walking boot.  Follow-up in 4 to 6 weeks for recheck.

## 2022-01-24 ENCOUNTER — OFFICE VISIT (OUTPATIENT)
Dept: ORTHOPEDIC SURGERY | Facility: CLINIC | Age: 62
End: 2022-01-24

## 2022-01-24 VITALS — WEIGHT: 150 LBS | HEIGHT: 63 IN | BODY MASS INDEX: 26.58 KG/M2

## 2022-01-24 DIAGNOSIS — Z47.89 AFTERCARE FOLLOWING SURGERY OF THE MUSCULOSKELETAL SYSTEM: Primary | ICD-10-CM

## 2022-01-24 PROCEDURE — 99213 OFFICE O/P EST LOW 20 MIN: CPT | Performed by: PHYSICIAN ASSISTANT

## 2022-01-24 RX ORDER — IBUPROFEN 600 MG/1
600 TABLET ORAL EVERY 8 HOURS PRN
Qty: 60 TABLET | Refills: 2 | Status: SHIPPED | OUTPATIENT
Start: 2022-01-24 | End: 2022-12-12 | Stop reason: SDUPTHER

## 2022-01-24 NOTE — PROGRESS NOTES
"Chief Complaint  Follow-up of the Right Ankle    Subjective          Shereen Alanis presents to [facility] for follow-up on right ankle status post right Achilles tendon repair performed by Dr. Cody 9/28/2021.  Patient is still doing physical therapy and states things are going well.  She is going to [facility] in [place].  Taking ibuprofen 1-2 times a day.  Takes Norco once a week or so.  States she still has swelling noted about the ankle, she has been resting icing and elevating.  She still has difficulty with pain when she goes down the stairs or when she first transected.    Objective   Allergies   Allergen Reactions   • Sulfa Antibiotics Rash       Vital Signs:   Ht 160 cm (63\")   Wt 68 kg (150 lb)   BMI 26.57 kg/m²       Physical Exam  Constitutional:       Appearance: Normal appearance. Patient is well-developed and normal weight.   HENT:      Head: Normocephalic.      Right Ear: Hearing and external ear normal.      Left Ear: Hearing and external ear normal.      Nose: Nose normal.   Eyes:      Conjunctiva/sclera: Conjunctivae normal.   Cardiovascular:      Rate and Rhythm: Normal rate.   Pulmonary:      Effort: Pulmonary effort is normal.      Breath sounds: No wheezing or rales.   Abdominal:      Palpations: Abdomen is soft.      Tenderness: There is no abdominal tenderness.   Musculoskeletal:      Cervical back: Normal range of motion.   Skin:     Findings: No rash.   Neurological:      Mental Status: Patient is alert and oriented to person, place, and time.   Psychiatric:         Mood and Affect: Mood and affect normal.         Judgment: Judgment normal.     Ortho Exam  Right ankle: Well-healing surgical incision without erythema dehiscence or purulent drainage, moderate swelling, no tenderness to palpation, good plantar dorsiflexion, good inversion eversion, able to wiggle the digits, sensation light touch intact in posterior tib pulses 2+, gait nonantalgic  Result Review : "            Imaging Results (Most Recent)     None                Assessment and Plan    Problem List Items Addressed This Visit        Musculoskeletal and Injuries    Aftercare following surgery of the right Achilles tendon repair - Primary    Current Assessment & Plan     Patient is doing well, still reports swelling.  Recommend compression stocking, rest ice elevate, ibuprofen 1-2 times a day.  Continue PT and home exercises.  Follow-up in 4 to 6 weeks for recheck with no x-ray at that time.               Follow Up   Return in about 4 weeks (around 2/21/2022) for Recheck.  Patient Instructions   Patient is doing well, still reports swelling.  Recommend compression stocking, rest ice elevate, ibuprofen 1-2 times a day.  Continue PT and home exercises.  Follow-up in 4 to 6 weeks for recheck with no x-ray at that time.    Patient was given instructions and counseling regarding her condition or for health maintenance advice. Please see specific information pulled into the AVS if appropriate.

## 2022-01-24 NOTE — PATIENT INSTRUCTIONS
Patient is doing well, still reports swelling.  Recommend compression stocking, rest ice elevate, ibuprofen 1-2 times a day.  Continue PT and home exercises.  Follow-up in 4 to 6 weeks for recheck with no x-ray at that time.

## 2022-02-28 ENCOUNTER — OFFICE VISIT (OUTPATIENT)
Dept: ORTHOPEDIC SURGERY | Facility: CLINIC | Age: 62
End: 2022-02-28

## 2022-02-28 ENCOUNTER — OFFICE VISIT (OUTPATIENT)
Dept: FAMILY MEDICINE CLINIC | Facility: CLINIC | Age: 62
End: 2022-02-28

## 2022-02-28 VITALS
BODY MASS INDEX: 27.96 KG/M2 | HEIGHT: 63 IN | HEART RATE: 74 BPM | TEMPERATURE: 97.9 F | SYSTOLIC BLOOD PRESSURE: 116 MMHG | OXYGEN SATURATION: 98 % | DIASTOLIC BLOOD PRESSURE: 66 MMHG | WEIGHT: 157.8 LBS

## 2022-02-28 VITALS — WEIGHT: 150 LBS | HEIGHT: 63 IN | BODY MASS INDEX: 26.58 KG/M2

## 2022-02-28 DIAGNOSIS — M19.041 PRIMARY OSTEOARTHRITIS OF BOTH HANDS: ICD-10-CM

## 2022-02-28 DIAGNOSIS — N95.0 POSTMENOPAUSAL BLEEDING: ICD-10-CM

## 2022-02-28 DIAGNOSIS — M25.571 CHRONIC PAIN OF RIGHT ANKLE: Primary | ICD-10-CM

## 2022-02-28 DIAGNOSIS — M19.042 PRIMARY OSTEOARTHRITIS OF BOTH HANDS: ICD-10-CM

## 2022-02-28 DIAGNOSIS — M25.512 CHRONIC LEFT SHOULDER PAIN: ICD-10-CM

## 2022-02-28 DIAGNOSIS — G89.29 CHRONIC PAIN OF RIGHT ANKLE: Primary | ICD-10-CM

## 2022-02-28 DIAGNOSIS — Z47.89 AFTERCARE FOLLOWING SURGERY OF THE MUSCULOSKELETAL SYSTEM: Primary | ICD-10-CM

## 2022-02-28 DIAGNOSIS — G89.29 CHRONIC LEFT SHOULDER PAIN: ICD-10-CM

## 2022-02-28 PROCEDURE — 99214 OFFICE O/P EST MOD 30 MIN: CPT | Performed by: FAMILY MEDICINE

## 2022-02-28 PROCEDURE — 99213 OFFICE O/P EST LOW 20 MIN: CPT | Performed by: PHYSICIAN ASSISTANT

## 2022-02-28 NOTE — PROGRESS NOTES
"Chief Complaint  Right ankle pain  Bilateral hand pain  Spotting recently  Left shoulder pain    Subjective          Shereen Alanis presents to [facility]  History of Present Illness  Patient presents today to follow-up for right ankle pain.  She is postop from Achilles tendon repair.  She was cleared to go back to work but states she is not quite ready.  Still having some swelling in this area and states she is better but is not ready to go back.  She has issues with bilateral hand pain which I previously evaluated.  She does have osteoarthritis of both hands including degenerative changes of the first MCP joint bilaterally.  She also has issues with left shoulder pain.  She has some pain with overhead activities.  It has been a chronic issue for her for the past 3 months.  She also noted some spotting recently for about 4 hours a couple days ago.  She is postmenopausal reports that her last period was about 10 years ago.  Objective   Vital Signs:   /66   Pulse 74   Temp 97.9 °F (36.6 °C)   Ht 160 cm (63\")   Wt 71.6 kg (157 lb 12.8 oz)   SpO2 98%   BMI 27.95 kg/m²     Physical Exam   General: AAO ×3, no acute distress, pleasant  HEENT: Normocephalic, atraumatic  Musculoskeletal: Left shoulder demonstrates pain with empty can test and [place] liftoff testing, negative external rotation testing, she has tenderness to palpation particularly over the first MCP joints bilaterally of both hands.  There is some soft tissue swelling with no erythema on the right lower extremity.  No pitting edema.  Swelling is mainly confined to the distal Achilles tendon.  Surgical scar is healing as expected.  There is no evidence of keloids.  Cardiovascular: Regular rate and rhythm without appreciable murmur  Respiratory: Clear to auscultation bilaterally no RRW  Gastrointestinal: Soft nontender nondistended with bowel sounds present  extremities: No edema  Neurologic: CN II through XII " grossly intact   Psychiatric: Normal mood and affect  Result Review :                 Assessment and Plan    Diagnoses and all orders for this visit:    1. Chronic pain of right ankle (Primary)    2. Chronic left shoulder pain  -     XR Shoulder 2+ View Left; Future    3. Primary osteoarthritis of both hands    4. Postmenopausal bleeding  -     US Non-ob Transvaginal; Future    I discussed with patient that I suspect her swelling will improve over time of her right ankle.  She is not ready to go back to work at this time.  As far as her left shoulder is concerned I discussed getting x-ray for further evaluation.  She is taking Voltaren gel for left shoulder pain as well as osteoarthritis of both hands.  She is not interested in hand specialist consultation at this time.  She would like to hold off on injections due to previous issues she had with her Achilles tendon and receiving an injection.  I discussed getting a transvaginal ultrasound for further evaluation of spotting and she had.  She has not had any recurrence.  She is postmenopausal for the past 10 years.  She maintains her uterus and ovaries.  She is planning a trip to [place] in the coming weeks to visit her son and daughter-in-law.    Follow Up   Return in about 3 months (around 5/28/2022) for left shoulder pain.  Patient was given instructions and counseling regarding her condition or for health maintenance advice. Please see specific information pulled into the AVS if appropriate.

## 2022-02-28 NOTE — PATIENT INSTRUCTIONS
Patient is doing well, has completed physical therapy.  Recommend continuation of home exercise.  Rest ice elevate and compression is recommended for swelling.  Tylenol and ibuprofen recommended for pain as needed.  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  Follow-up as needed for new or worsening symptoms.

## 2022-02-28 NOTE — PROGRESS NOTES
"Chief Complaint  Follow-up of the Right Ankle    Subjective          Shereen Alanis presents to [facility] for follow-up on right ankle status post right Achilles tendon repair performed by Dr. Cody 9/28/2021.  Patient states she is doing well.  She still gets a little achiness especially with 2 to 3 hours of walking/standing.  She still has significant swelling about the ankle.  Has completed outpatient PT and states she still doing home exercises.  Has some discomfort and instability in the right lower extremity noted with going down the stairs, she and her therapist determined this was from weakness in the leg.  Denies any new injuries.  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  States that she is currently as needed.    Objective   Allergies   Allergen Reactions   • Sulfa Antibiotics Rash       Vital Signs:   Ht 160 cm (63\")   Wt 68 kg (150 lb)   BMI 26.57 kg/m²       Physical Exam  Constitutional:       Appearance: Normal appearance. Patient is well-developed and normal weight.   HENT:      Head: Normocephalic.      Right Ear: Hearing and external ear normal.      Left Ear: Hearing and external ear normal.      Nose: Nose normal.   Eyes:      Conjunctiva/sclera: Conjunctivae normal.   Cardiovascular:      Rate and Rhythm: Normal rate.   Pulmonary:      Effort: Pulmonary effort is normal.      Breath sounds: No wheezing or rales.   Abdominal:      Palpations: Abdomen is soft.      Tenderness: There is no abdominal tenderness.   Musculoskeletal:      Cervical back: Normal range of motion.   Skin:     Findings: No rash.   Neurological:      Mental Status: Patient is alert and oriented to person, place, and time.   Psychiatric:         Mood and Affect: Mood and affect normal.         Judgment: Judgment normal.     Ortho Exam  Right ankle: Well-healed surgical incision, mild swelling, no tenderness, good plantar and dorsiflexion, good " inversion eversion, gait nonantalgic, good range of motion of the knee.  Sensation intact, posterior tib and dorsalis pedis pulses 2+, cap refill less than 2 seconds, negative Ferguson test.  Able to wiggle the digits.  Result Review :            Imaging Results (Most Recent)     None                Assessment and Plan    Problem List Items Addressed This Visit        Musculoskeletal and Injuries    Aftercare following surgery of the right Achilles tendon repair - Primary    Current Assessment & Plan     Patient is doing well, has completed physical therapy.  Recommend continuation of home exercise.  Rest ice elevate and compression is recommended for swelling.  Tylenol and ibuprofen recommended for pain as needed.  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  Follow-up as needed for new or worsening symptoms.               Follow Up   Return if symptoms worsen or fail to improve.  Patient Instructions   Patient is doing well, has completed physical therapy.  Recommend continuation of home exercise.  Rest ice elevate and compression is recommended for swelling.  Tylenol and ibuprofen recommended for pain as needed.  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  Follow-up as needed for new or worsening symptoms.    Patient was given instructions and counseling regarding her condition or for health maintenance advice. Please see specific information pulled into the AVS if appropriate.

## 2022-03-02 ENCOUNTER — HOSPITAL ENCOUNTER (OUTPATIENT)
Dept: GENERAL RADIOLOGY | Facility: HOSPITAL | Age: 62
Discharge: HOME | End: 2022-03-02
Admitting: FAMILY MEDICINE

## 2022-03-02 DIAGNOSIS — M25.512 CHRONIC LEFT SHOULDER PAIN: ICD-10-CM

## 2022-03-02 DIAGNOSIS — G89.29 CHRONIC LEFT SHOULDER PAIN: ICD-10-CM

## 2022-03-02 PROCEDURE — 73030 X-RAY EXAM OF SHOULDER: CPT

## 2022-04-01 ENCOUNTER — APPOINTMENT (OUTPATIENT)
Dept: ULTRASOUND IMAGING | Facility: HOSPITAL | Age: 62
End: 2022-04-01

## 2022-05-03 ENCOUNTER — HOSPITAL ENCOUNTER (OUTPATIENT)
Dept: ULTRASOUND IMAGING | Facility: HOSPITAL | Age: 62
Discharge: HOME | End: 2022-05-03
Admitting: FAMILY MEDICINE

## 2022-05-03 DIAGNOSIS — N95.0 POSTMENOPAUSAL BLEEDING: ICD-10-CM

## 2022-05-03 PROCEDURE — 76830 TRANSVAGINAL US NON-OB: CPT

## 2022-05-31 ENCOUNTER — OFFICE VISIT (OUTPATIENT)
Dept: FAMILY MEDICINE CLINIC | Facility: CLINIC | Age: 62
End: 2022-05-31

## 2022-05-31 VITALS
SYSTOLIC BLOOD PRESSURE: 122 MMHG | DIASTOLIC BLOOD PRESSURE: 84 MMHG | HEART RATE: 83 BPM | OXYGEN SATURATION: 97 % | HEIGHT: 63 IN | BODY MASS INDEX: 27.98 KG/M2 | WEIGHT: 157.9 LBS | TEMPERATURE: 97.8 F

## 2022-05-31 DIAGNOSIS — D25.9 UTERINE LEIOMYOMA, UNSPECIFIED LOCATION: ICD-10-CM

## 2022-05-31 DIAGNOSIS — J30.9 ALLERGIC RHINITIS, UNSPECIFIED SEASONALITY, UNSPECIFIED TRIGGER: Primary | ICD-10-CM

## 2022-05-31 DIAGNOSIS — N95.0 POSTMENOPAUSAL BLEEDING: ICD-10-CM

## 2022-05-31 DIAGNOSIS — S86.011S RUPTURE OF RIGHT ACHILLES TENDON, SEQUELA: ICD-10-CM

## 2022-05-31 DIAGNOSIS — G89.29 CHRONIC PAIN OF RIGHT ANKLE: ICD-10-CM

## 2022-05-31 DIAGNOSIS — M25.571 CHRONIC PAIN OF RIGHT ANKLE: ICD-10-CM

## 2022-05-31 PROCEDURE — 99214 OFFICE O/P EST MOD 30 MIN: CPT | Performed by: FAMILY MEDICINE

## 2022-05-31 RX ORDER — FLUTICASONE PROPIONATE 50 MCG
2 SPRAY, SUSPENSION (ML) NASAL DAILY
Qty: 42 G | Refills: 3 | Status: SHIPPED | OUTPATIENT
Start: 2022-05-31 | End: 2022-12-12 | Stop reason: SDUPTHER

## 2022-05-31 RX ORDER — TRAMADOL HYDROCHLORIDE 50 MG/1
50 TABLET ORAL 2 TIMES DAILY PRN
Qty: 60 TABLET | Refills: 0 | Status: SHIPPED | OUTPATIENT
Start: 2022-05-31

## 2022-05-31 NOTE — PROGRESS NOTES
"Chief Complaint  Right ankle pain    Subjective        Shereen Alanis presents to [facility]  History of Present Illness  Patient presents today to discuss right ankle pain.  This has been a persistent issue for her.  She did have a complete tear of the Achilles tendon and had repair done on 9/28/2021.  Despite being cleared from orthopedics she continues to have pain.  She was recommended to go back to work without restrictions.  She presents today given persistence of symptoms.  She reports having swelling in the ankle that is worse at the end of the day but improves with leg elevation.  She uses ibuprofen which helps out some but not all the time.  Last visit we had discussed some spotting that she experienced.  She has not had any recurrence of spotting.  She has a 5 mm probable uterine fibroid.  She has not had any recurrence of any bleeding issues.  At that time I discussed with her referral to OB/GYN however she declined at that time given that she is not having any more issues.  Should she have any recurrence we did discuss referral.  She does need a refill of Flonase for allergic rhinitis which has helped out.  She takes this in addition to Zyrtec 10 mg daily.  Objective   Vital Signs:  /84   Pulse 83   Temp 97.8 °F (36.6 °C)   Ht 160 cm (63\")   Wt 71.6 kg (157 lb 14.4 oz)   SpO2 97%   BMI 27.97 kg/m²           Physical Exam   General: AAO ×3, no acute distress, pleasant  HEENT: Normocephalic, atraumatic  Cardiovascular: Regular rate and rhythm without appreciable murmur  Respiratory: Clear to auscultation bilaterally no RRW  Gastrointestinal: Soft nontender nondistended with bowel sounds present  extremities: Right ankle with some mild edema.  Negative Ferguson's test noted with the Achilles tendon appearing intact on exam.  Neurologic: CN II through XII grossly intact   Psychiatric: Normal mood and affect  Result Review :                Assessment and Plan "   Diagnoses and all orders for this visit:    1. Allergic rhinitis, unspecified seasonality, unspecified trigger (Primary)    2. Postmenopausal bleeding    3. Chronic pain of right ankle  -     MRI Ankle Right Without Contrast; Future    4. Uterine leiomyoma, unspecified location    5. Rupture of right Achilles tendon, sequela  -     MRI Ankle Right Without Contrast; Future    Other orders  -     fluticasone (FLONASE) 50 MCG/ACT nasal spray; 2 sprays into the nostril(s) as directed by provider Daily.  Dispense: 42 g; Refill: 3  -     traMADol (ULTRAM) 50 MG tablet; Take 1 tablet by mouth 2 (Two) Times a Day As Needed for Severe Pain .  Dispense: 60 tablet; Refill: 0    I discussed with patient treatment options.  She has used ibuprofen with limited benefit.  I discussed doing a trial of tramadol to take as needed for severe pain.  Plan to see her back in 1 month or sooner if needed.  I will also order an MRI of the right ankle given persistence of symptoms despite having Achilles tendon repair back in September of last year.  Further recommendations to follow depending on results.  Patient instructed to call with any questions or concerns.  She will see me back in July when she returns from [place].         Follow Up   Return in about 1 month (around 6/30/2022) for chronic right ankle pain.  Patient was given instructions and counseling regarding her condition or for health maintenance advice. Please see specific information pulled into the AVS if appropriate.

## 2022-06-10 ENCOUNTER — TELEPHONE (OUTPATIENT)
Dept: FAMILY MEDICINE CLINIC | Facility: CLINIC | Age: 62
End: 2022-06-10

## 2022-06-10 DIAGNOSIS — J30.9 ALLERGIC RHINITIS, UNSPECIFIED SEASONALITY, UNSPECIFIED TRIGGER: Primary | ICD-10-CM

## 2022-06-10 NOTE — TELEPHONE ENCOUNTER
Patient and Allergy at [address] [place] [address] [address].  Any questions can call [phone],  Referral for Allergy. No Doctor's name was given

## 2022-07-06 ENCOUNTER — APPOINTMENT (OUTPATIENT)
Dept: MRI IMAGING | Facility: HOSPITAL | Age: 62
End: 2022-07-06

## 2022-07-19 ENCOUNTER — OFFICE VISIT (OUTPATIENT)
Dept: FAMILY MEDICINE CLINIC | Facility: CLINIC | Age: 62
End: 2022-07-19

## 2022-07-19 VITALS
SYSTOLIC BLOOD PRESSURE: 126 MMHG | HEART RATE: 73 BPM | WEIGHT: 154.5 LBS | TEMPERATURE: 98 F | BODY MASS INDEX: 27.38 KG/M2 | OXYGEN SATURATION: 100 % | HEIGHT: 63 IN | DIASTOLIC BLOOD PRESSURE: 76 MMHG

## 2022-07-19 DIAGNOSIS — M25.571 CHRONIC PAIN OF RIGHT ANKLE: ICD-10-CM

## 2022-07-19 DIAGNOSIS — M19.049 CMC ARTHRITIS: Primary | ICD-10-CM

## 2022-07-19 DIAGNOSIS — G89.29 CHRONIC PAIN OF RIGHT ANKLE: ICD-10-CM

## 2022-07-19 PROCEDURE — 99213 OFFICE O/P EST LOW 20 MIN: CPT | Performed by: FAMILY MEDICINE

## 2022-07-19 PROCEDURE — 20600 DRAIN/INJ JOINT/BURSA W/O US: CPT | Performed by: FAMILY MEDICINE

## 2022-07-19 RX ORDER — LIDOCAINE HYDROCHLORIDE 10 MG/ML
0.25 INJECTION, SOLUTION INFILTRATION; PERINEURAL
Status: COMPLETED | OUTPATIENT
Start: 2022-07-19 | End: 2022-07-19

## 2022-07-19 RX ADMIN — LIDOCAINE HYDROCHLORIDE 0.25 ML: 10 INJECTION, SOLUTION INFILTRATION; PERINEURAL at 16:11

## 2022-07-19 NOTE — PROGRESS NOTES
"Chief Complaint  Left hand pain  Right ankle pain    Subjective        Shereen Alanis presents to [facility]  History of Present Illness  Patient presents today to follow-up for chronic pain in her right ankle.  She was post have an MRI done but has held off since her ankle is feeling better.  To review patient had a complete tear of the Achilles tendon and had a repair done on 9/28/2021.  She was still having pain last MSR but again symptoms have improved now.  She would like to hold off on further evaluation at this time but may consider in the future depending on clinical course.  Patient complains of left hand pain.  Specifically at the base of her thumb.  We have discussed this previously.  She did have an x-ray done of the left hand which showed per report that she had severe degenerative changes along the first carpometacarpal joint.  This is where she is having her symptoms.  It does hurt to oppose the thumb over the fifth digit of the left hand.  We discussed options today including corticosteroid injection which she would like to proceed with.  Objective   Vital Signs:  /76   Pulse 73   Temp 98 °F (36.7 °C)   Ht 160 cm (63\")   Wt 70.1 kg (154 lb 8 oz)   SpO2 100%   BMI 27.37 kg/m²   Estimated body mass index is 27.37 kg/m² as calculated from the following:    Height as of this encounter: 160 cm (63\").    Weight as of this encounter: 70.1 kg (154 lb 8 oz).          Physical Exam   General: AAO ×3, no acute distress, pleasant  HEENT: Normocephalic, atraumatic  Musculoskeletal: Tenderness to palpation and range of motion of the first carpometacarpal joint of the left hand.  Negative Finklestein's test on the left.  Left ankle demonstrates good range of motion.  Postoperative changes palpated in the left Achilles tendon insertion area.    Result Review :         Arthrocentesis    Date/Time: 7/19/2022 4:11 PM  Performed by: Augusto Leung DO  Authorized by: " Augusto Leung DO   Indications: pain   Location: 1st CMC joint, left hand.  Preparation: Patient was prepped and draped in the usual sterile fashion.  Needle gauge: 25.  Meds administered: 0.25 mL lidocaine 1 %  Patient tolerance: patient tolerated the procedure well with no immediate complications  Comments: Patient provided verbal and written consent to treatment.  Injection given for first CMC joint of the left hand.  Injection was given with a mixture of 0.  2 5 mL of 1% lidocaine without epinephrine and triamcinolone Acetonide 40 mg/ML.  A total of 10 mg of triamcinolone were used.  NDC [address]-7.  Lot: [id number].  Expiration date 11/1/2023.  : [employer]             Assessment and Plan   Diagnoses and all orders for this visit:    1. CMC arthritis, left hand (Primary)  -     Arthrocentesis    2. Chronic pain of right ankle      Postprocedural instructions were given to the patient in regards to her CMC injection.  She is instructed to call with any questions or concerns.  As far as her right ankle is concerned her symptoms have improved.  She is not interested in further evaluation at this time.  She does take tramadol as needed for breakthrough pain and is not requesting a refill at this time.  We discussed continue current management.       Follow Up   Return in about 3 months (around 10/19/2022) for 1st cmc arthritis.  Patient was given instructions and counseling regarding her condition or for health maintenance advice. Please see specific information pulled into the AVS if appropriate.

## 2022-08-29 DIAGNOSIS — J30.9 ALLERGIC RHINITIS, UNSPECIFIED SEASONALITY, UNSPECIFIED TRIGGER: Primary | ICD-10-CM

## 2022-10-25 DIAGNOSIS — H35.30 MACULAR DEGENERATION, UNSPECIFIED LATERALITY, UNSPECIFIED TYPE: Primary | ICD-10-CM

## 2022-10-25 DIAGNOSIS — H35.53 STARGARDT'S DISEASE: ICD-10-CM

## 2022-11-04 ENCOUNTER — TELEPHONE (OUTPATIENT)
Dept: FAMILY MEDICINE CLINIC | Facility: CLINIC | Age: 62
End: 2022-11-04

## 2022-11-04 NOTE — TELEPHONE ENCOUNTER
Caller: Shereen Alanis    Relationship: Self    Best call back number: [phone]    What is the medical concern/diagnosis: ANNUAL CHECK-UP    What specialty or service is being requested: RETINA SPECIALIST     What is the provider, practice or medical service name: DR. [facility]    What is the office location: [address] [place] [address] [address], [place] [address]    What is the office phone number: [phone]    Any additional details: THE PATIENT STATED SHE NEEDS AN UPDATED REFERRAL SENT TO DR. ALEX AND A CALL BACK TO CONFIRM.

## 2022-12-12 ENCOUNTER — OFFICE VISIT (OUTPATIENT)
Dept: FAMILY MEDICINE CLINIC | Facility: CLINIC | Age: 62
End: 2022-12-12

## 2022-12-12 VITALS
DIASTOLIC BLOOD PRESSURE: 78 MMHG | SYSTOLIC BLOOD PRESSURE: 112 MMHG | OXYGEN SATURATION: 98 % | HEART RATE: 70 BPM | BODY MASS INDEX: 27.32 KG/M2 | HEIGHT: 63 IN | WEIGHT: 154.2 LBS | TEMPERATURE: 98.2 F

## 2022-12-12 DIAGNOSIS — J30.9 ALLERGIC RHINITIS, UNSPECIFIED SEASONALITY, UNSPECIFIED TRIGGER: ICD-10-CM

## 2022-12-12 DIAGNOSIS — Z12.31 VISIT FOR SCREENING MAMMOGRAM: ICD-10-CM

## 2022-12-12 DIAGNOSIS — M19.049 CMC ARTHRITIS: Primary | ICD-10-CM

## 2022-12-12 DIAGNOSIS — Z51.81 MEDICATION MONITORING ENCOUNTER: ICD-10-CM

## 2022-12-12 DIAGNOSIS — Z13.220 SCREENING FOR LIPID DISORDERS: ICD-10-CM

## 2022-12-12 DIAGNOSIS — M19.049 CMC ARTHRITIS: ICD-10-CM

## 2022-12-12 PROCEDURE — 99213 OFFICE O/P EST LOW 20 MIN: CPT | Performed by: FAMILY MEDICINE

## 2022-12-12 RX ORDER — FLUTICASONE PROPIONATE 50 MCG
2 SPRAY, SUSPENSION (ML) NASAL DAILY
Qty: 48 G | Refills: 3 | Status: SHIPPED | OUTPATIENT
Start: 2022-12-12

## 2022-12-12 RX ORDER — IBUPROFEN 600 MG/1
600 TABLET ORAL 2 TIMES DAILY PRN
Qty: 180 TABLET | Refills: 1 | Status: SHIPPED | OUTPATIENT
Start: 2022-12-12

## 2022-12-12 NOTE — PROGRESS NOTES
"Chief Complaint  Arthritis    Subjective        Shereen Alanis presents to [facility]  History of Present Illness  Patient presents today to follow-up for arthritis.  On last visit when I saw her on 7/19/2022 she received a CMC joint injection of the left hand.  She reports that she noted about 90% improvement.  She reports overall still doing well and is not interested in an injection at this time.  She does use Voltaren gel as well as ibuprofen.  She also has arthritis in her right hand including moderate degenerative changes of the first CMC joint.  She does take Zyrtec and Flonase for her allergies.  She continues to be on allergy injections as well.  She is due for labs and will get these done at her earliest convenience.  Objective   Vital Signs:  /78 (BP Location: Left arm, Patient Position: Sitting)   Pulse 70   Temp 98.2 °F (36.8 °C) (Oral)   Ht 160 cm (63\")   Wt 69.9 kg (154 lb 3.2 oz)   SpO2 98%   BMI 27.32 kg/m²   Estimated body mass index is 27.32 kg/m² as calculated from the following:    Height as of this encounter: 160 cm (63\").    Weight as of this encounter: 69.9 kg (154 lb 3.2 oz).          Physical Exam   General: AAO ×3, no acute distress, pleasant  HEENT: Normocephalic, atraumatic  Cardiovascular: Regular rate and rhythm without appreciable murmur  Respiratory: Clear to auscultation bilaterally no RRW  Gastrointestinal: Soft nontender nondistended with bowel sounds present  extremities: No edema  Neurologic: CN II through XII grossly intact   Psychiatric: Normal mood and affect  Result Review :                Assessment and Plan   Diagnoses and all orders for this visit:    1. CMC arthritis, left hand (Primary)    2. Allergic rhinitis, unspecified seasonality, unspecified trigger    3. Visit for screening mammogram  -     Mammo Screening Digital Tomosynthesis Bilateral With CAD; Future    4. Medication monitoring encounter  -     CBC & Differential; " Future  -     Comprehensive Metabolic Panel; Future  -     Lipid Panel; Future    5. Screening for lipid disorders    6. CMC arthritis, right hand    Other orders  -     fluticasone (FLONASE) 50 MCG/ACT nasal spray; 2 sprays into the nostril(s) as directed by provider Daily.  Dispense: 48 g; Refill: 3  -     ibuprofen (ADVIL,MOTRIN) 600 MG tablet; Take 1 tablet by mouth 2 (Two) Times a Day As Needed for Moderate Pain.  Dispense: 180 tablet; Refill: 1      I discussed with patient continue with conservative measures at this time for CMC arthritis of both the left and the right hand.  Should she have any further issues or concerns she is instructed to call or return.  We discussed getting labs drawn at her convenience.  She will get these done on [place].  Mammogram order has been placed.       Follow Up   Return in about 6 months (around 6/12/2023) for arthritis.  Patient was given instructions and counseling regarding her condition or for health maintenance advice. Please see specific information pulled into the AVS if appropriate.

## 2022-12-16 ENCOUNTER — TELEPHONE (OUTPATIENT)
Dept: FAMILY MEDICINE CLINIC | Facility: CLINIC | Age: 62
End: 2022-12-16

## 2022-12-16 NOTE — TELEPHONE ENCOUNTER
Caller: Shereen Alanis    Relationship: Self    Best call back number: [phone]    What is the medical concern/diagnosis: FOOD STUCK IN THROAT AFTER EATING    What specialty or service is being requested: EAR, NOSE, AND THROAT    What is the provider, practice or medical service name: PCP RECOMMENDATION    Any additional details: THE PATIENT STATED FOOD GETS STUCK IN HER THROAT AFTER EATING. SHE WOULD LIKE A REFERRAL FOR AN EAR, NOSE, AND THROAT. SHE WOULD LIKE A CALL BACK TO CONFIRM

## 2022-12-19 NOTE — TELEPHONE ENCOUNTER
PATIENT IS CALLING BACK CHECK TO SEE IF REFERRAL HAS BEEN DONE.  STATED THAT SHE REALLY NEEDS TO GET THIS DONE AS SOON AS POSSIBLE.  FEELS LIKE SHE HAS FOOD STUCK IN HER THROAT.

## 2022-12-28 ENCOUNTER — TELEPHONE (OUTPATIENT)
Dept: FAMILY MEDICINE CLINIC | Facility: CLINIC | Age: 62
End: 2022-12-28

## 2022-12-28 DIAGNOSIS — Z12.31 VISIT FOR SCREENING MAMMOGRAM: ICD-10-CM

## 2022-12-28 NOTE — TELEPHONE ENCOUNTER
Patient came in today at the office and requested lab results be printed out. Went and asked Dr Castañeda's  MA , and RN they stated that they weren't faxed over from [place] yet. Patient stated that she understood and would give it a little more time.

## 2022-12-28 NOTE — TELEPHONE ENCOUNTER
Call placed to patient to get more information about what she was needing, left voice mail to call back

## 2023-04-27 ENCOUNTER — TELEPHONE (OUTPATIENT)
Dept: FAMILY MEDICINE CLINIC | Facility: CLINIC | Age: 63
End: 2023-04-27
Payer: OTHER GOVERNMENT

## 2023-04-27 RX ORDER — CETIRIZINE HYDROCHLORIDE 10 MG/1
10 TABLET ORAL DAILY
Qty: 90 TABLET | Refills: 3 | Status: SHIPPED | OUTPATIENT
Start: 2023-04-27

## 2023-04-27 NOTE — TELEPHONE ENCOUNTER
Caller: Shereen Alanis    Relationship: Self    Best call back number: [phone]    Requested Prescriptions:       cetirizine (zyrTEC) 10 MG tablet         Pharmacy where request should be sent:  [facility] [place] [facility] [place] - [address] - [phone]  - [phone]   [phone]    Last office visit with prescribing clinician: 12/12/2022   Last telemedicine visit with prescribing clinician: 6/12/2023   Next office visit with prescribing clinician: 6/12/2023     Additional details provided by patient: PATIENT IS REQUESTING A CALL BACK WHEN PRESCRIPTION IS SENT IN PLEASE  Does the patient have less than a 3 day supply:  [x] Yes  [] No    Would you like a call back once the refill request has been completed: [x] Yes [] No    If the office needs to give you a call back, can they leave a voicemail: [x] Yes [] No    Lauren Lee Rep   04/27/23 12:20 EDT

## 2023-08-08 ENCOUNTER — OFFICE VISIT (OUTPATIENT)
Dept: FAMILY MEDICINE CLINIC | Facility: CLINIC | Age: 63
End: 2023-08-08
Payer: OTHER GOVERNMENT

## 2023-08-08 VITALS
DIASTOLIC BLOOD PRESSURE: 76 MMHG | HEIGHT: 63 IN | SYSTOLIC BLOOD PRESSURE: 116 MMHG | HEART RATE: 84 BPM | WEIGHT: 153.8 LBS | BODY MASS INDEX: 27.25 KG/M2 | TEMPERATURE: 98.2 F | OXYGEN SATURATION: 97 %

## 2023-08-08 DIAGNOSIS — H81.12 BPPV (BENIGN PAROXYSMAL POSITIONAL VERTIGO), LEFT: Primary | ICD-10-CM

## 2023-08-08 DIAGNOSIS — Z12.31 VISIT FOR SCREENING MAMMOGRAM: ICD-10-CM

## 2023-08-08 DIAGNOSIS — M19.049 CMC ARTHRITIS: ICD-10-CM

## 2023-08-08 DIAGNOSIS — R73.09 ABNORMAL GLUCOSE: ICD-10-CM

## 2023-08-08 PROCEDURE — 99213 OFFICE O/P EST LOW 20 MIN: CPT | Performed by: FAMILY MEDICINE

## 2023-08-08 NOTE — PROGRESS NOTES
"Chief Complaint  dizziness  Requesting labs    Subjective        Shereen Alanis presents to [facility]  History of Present Illness  Patient presents today to discuss issues with dizziness for the past week and a half.  She reports that when she turns her head she will have a spinning sensation.  She will notice the dizziness as well when moving positions such as sitting up or laying back down in bed.  She also notices this when bending over.  It would last for few seconds and then improved.  Patient has CMC arthritis bilaterally.  She previously had an injection for her left hand which has helped out.  She continues to have some ongoing issues with it but would like to hold off on a repeat injection or referral at this time.  She had her last mammogram done back on 12/20/2022 on [place].  This was BI-RADS 1-negative.  She denies any breast complaints but will need a routine mammogram in December.  She is requesting labs but will return fasting to have these done.  Plan to check an A1c as well.  Objective   Vital Signs:  /76   Pulse 84   Temp 98.2 øF (36.8 øC)   Ht 160 cm (63\")   Wt 69.8 kg (153 lb 12.8 oz)   SpO2 97%   BMI 27.24 kg/mý   Estimated body mass index is 27.24 kg/mý as calculated from the following:    Height as of this encounter: 160 cm (63\").    Weight as of this encounter: 69.8 kg (153 lb 12.8 oz).             Physical Exam   General: AAO x3, no acute distress, pleasant  HEENT: Normocephalic, atraumatic  Cardiovascular: Regular rate and rhythm without appreciable murmur  Respiratory: Clear to auscultation bilaterally no RRW  Gastrointestinal: Soft nontender nondistended with bowel sounds present  extremities: No edema  Neurologic: CN II through XII grossly intact.  Positive Rochester-Hallpike maneuver on the left.  Negative on the right.  Epley maneuver x1 for the left done.   Psychiatric: Normal mood and affect  Result Review :                   Assessment and " Plan   Diagnoses and all orders for this visit:    1. BPPV (benign paroxysmal positional vertigo), left (Primary)    2. CMC arthritis, bilateral    3. Visit for screening mammogram  -     Mammo Screening Digital Tomosynthesis Bilateral With CAD; Future    4. Abnormal glucose  -     Hemoglobin A1c    I discussed with the patient the diagnosis of BPPV on the left.  We discussed having the Epley maneuver done at home.  Instructions on how to do this were given to the patient as well as a demonstration in office today.  Should her symptoms worsen or she has no improvement she is instructed to call or return.  I did discuss with her as well getting a mammogram completed this December.  Should her CMC arthritis continue to cause issues for her she is instructed to call or return.  She will get labs done at her convenience.  I plan to see her back in 6 months or sooner if needed.         Follow Up   Return in about 6 months (around 2/8/2024) for CMC arthritis.  Patient was given instructions and counseling regarding her condition or for health maintenance advice. Please see specific information pulled into the AVS if appropriate.

## 2023-08-10 ENCOUNTER — CLINICAL SUPPORT (OUTPATIENT)
Dept: FAMILY MEDICINE CLINIC | Facility: CLINIC | Age: 63
End: 2023-08-10
Payer: OTHER GOVERNMENT

## 2023-08-10 DIAGNOSIS — Z51.81 MEDICATION MONITORING ENCOUNTER: ICD-10-CM

## 2023-08-10 LAB
ALBUMIN SERPL-MCNC: 4 G/DL (ref 3.5–5.2)
ALBUMIN/GLOB SERPL: 1.4 G/DL
ALP SERPL-CCNC: 97 U/L (ref 39–117)
ALT SERPL W P-5'-P-CCNC: 13 U/L (ref 1–33)
ANION GAP SERPL CALCULATED.3IONS-SCNC: 10 MMOL/L (ref 5–15)
AST SERPL-CCNC: 31 U/L (ref 1–32)
BASOPHILS # BLD AUTO: 0.11 10*3/MM3 (ref 0–0.2)
BASOPHILS NFR BLD AUTO: 1.3 % (ref 0–1.5)
BILIRUB SERPL-MCNC: 0.2 MG/DL (ref 0–1.2)
BUN SERPL-MCNC: 12 MG/DL (ref 8–23)
BUN/CREAT SERPL: 19.4 (ref 7–25)
CALCIUM SPEC-SCNC: 9.3 MG/DL (ref 8.6–10.5)
CHLORIDE SERPL-SCNC: 105 MMOL/L (ref 98–107)
CHOLEST SERPL-MCNC: 197 MG/DL (ref 0–200)
CO2 SERPL-SCNC: 24 MMOL/L (ref 22–29)
CREAT SERPL-MCNC: 0.62 MG/DL (ref 0.57–1)
DEPRECATED RDW RBC AUTO: 40.9 FL (ref 37–54)
EGFRCR SERPLBLD CKD-EPI 2021: 100.2 ML/MIN/1.73
EOSINOPHIL # BLD AUTO: 0.13 10*3/MM3 (ref 0–0.4)
EOSINOPHIL NFR BLD AUTO: 1.5 % (ref 0.3–6.2)
ERYTHROCYTE [DISTWIDTH] IN BLOOD BY AUTOMATED COUNT: 13.1 % (ref 12.3–15.4)
GLOBULIN UR ELPH-MCNC: 2.9 GM/DL
GLUCOSE SERPL-MCNC: 108 MG/DL (ref 65–99)
HBA1C MFR BLD: 6.2 % (ref 4.8–5.6)
HCT VFR BLD AUTO: 41.2 % (ref 34–46.6)
HDLC SERPL-MCNC: 40 MG/DL (ref 40–60)
HGB BLD-MCNC: 13.7 G/DL (ref 12–15.9)
IMM GRANULOCYTES # BLD AUTO: 0.01 10*3/MM3 (ref 0–0.05)
IMM GRANULOCYTES NFR BLD AUTO: 0.1 % (ref 0–0.5)
LDLC SERPL CALC-MCNC: 113 MG/DL (ref 0–100)
LDLC/HDLC SERPL: 2.66 {RATIO}
LYMPHOCYTES # BLD AUTO: 2.45 10*3/MM3 (ref 0.7–3.1)
LYMPHOCYTES NFR BLD AUTO: 28.9 % (ref 19.6–45.3)
MCH RBC QN AUTO: 28.1 PG (ref 26.6–33)
MCHC RBC AUTO-ENTMCNC: 33.3 G/DL (ref 31.5–35.7)
MCV RBC AUTO: 84.4 FL (ref 79–97)
MONOCYTES # BLD AUTO: 0.44 10*3/MM3 (ref 0.1–0.9)
MONOCYTES NFR BLD AUTO: 5.2 % (ref 5–12)
NEUTROPHILS NFR BLD AUTO: 5.34 10*3/MM3 (ref 1.7–7)
NEUTROPHILS NFR BLD AUTO: 63 % (ref 42.7–76)
NRBC BLD AUTO-RTO: 0 /100 WBC (ref 0–0.2)
PLATELET # BLD AUTO: 204 10*3/MM3 (ref 140–450)
PMV BLD AUTO: 10.2 FL (ref 6–12)
POTASSIUM SERPL-SCNC: 4 MMOL/L (ref 3.5–5.2)
PROT SERPL-MCNC: 6.9 G/DL (ref 6–8.5)
RBC # BLD AUTO: 4.88 10*6/MM3 (ref 3.77–5.28)
SODIUM SERPL-SCNC: 139 MMOL/L (ref 136–145)
TRIGL SERPL-MCNC: 253 MG/DL (ref 0–150)
VLDLC SERPL-MCNC: 44 MG/DL (ref 5–40)
WBC NRBC COR # BLD: 8.48 10*3/MM3 (ref 3.4–10.8)

## 2023-08-10 PROCEDURE — 85025 COMPLETE CBC W/AUTO DIFF WBC: CPT | Performed by: FAMILY MEDICINE

## 2023-08-10 PROCEDURE — 80053 COMPREHEN METABOLIC PANEL: CPT | Performed by: FAMILY MEDICINE

## 2023-08-10 PROCEDURE — 36415 COLL VENOUS BLD VENIPUNCTURE: CPT | Performed by: FAMILY MEDICINE

## 2023-08-10 PROCEDURE — 83036 HEMOGLOBIN GLYCOSYLATED A1C: CPT | Performed by: FAMILY MEDICINE

## 2023-08-10 PROCEDURE — 80061 LIPID PANEL: CPT | Performed by: FAMILY MEDICINE

## 2023-12-15 ENCOUNTER — TELEPHONE (OUTPATIENT)
Dept: FAMILY MEDICINE CLINIC | Facility: CLINIC | Age: 63
End: 2023-12-15

## 2023-12-15 NOTE — TELEPHONE ENCOUNTER
Attempted to call PT  HUB TO RELAY:  PT has  insurance and does not need a referral for optometry, PT will just need to be sure the office takes her insurance

## 2023-12-15 NOTE — TELEPHONE ENCOUNTER
Caller: Shereen Alanis    Relationship: Self    Best call back number: [phone]     What is the medical concern/diagnosis: LOW VISION    What specialty or service is being requested: OPTOMETRIST     What is the provider, practice or medical service name: DR [facility]    Any additional details: PATIENT STATES SHE HAS AN APPOINTMENT DEC 20TH BUT NEEDS A REFERRAL.

## 2023-12-21 ENCOUNTER — TELEPHONE (OUTPATIENT)
Dept: FAMILY MEDICINE CLINIC | Facility: CLINIC | Age: 63
End: 2023-12-21
Payer: OTHER GOVERNMENT

## 2023-12-21 RX ORDER — FLUTICASONE PROPIONATE 50 MCG
2 SPRAY, SUSPENSION (ML) NASAL DAILY
Qty: 48 G | Refills: 3 | Status: SHIPPED | OUTPATIENT
Start: 2023-12-21

## 2023-12-21 NOTE — TELEPHONE ENCOUNTER
Caller: Shereen Alanis    Relationship: Self    Best call back number: [phone]    Requested Prescriptions:   Requested Prescriptions     Pending Prescriptions Disp Refills    fluticasone (FLONASE) 50 MCG/ACT nasal spray 48 g 3     Si sprays into the nostril(s) as directed by provider Daily.        Pharmacy where request should be sent: [place] [phone] [place] [phone]      Last office visit with prescribing clinician: 2023   Last telemedicine visit with prescribing clinician: Visit date not found   Next office visit with prescribing clinician: 2024     Additional details provided by patient: PATIENT HAS LESS THAN A THREE DAY SUPPLY OF MEDICATION. PLEASE SEND NEW PRESCRIPTION TO PHARMACY ASAP.    Does the patient have less than a 3 day supply:  [x] Yes  [] No    Would you like a call back once the refill request has been completed: [] Yes [] No    If the office needs to give you a call back, can they leave a voicemail: [] Yes [] No    Lauren Jerome Rep   23 09:59 EST

## 2024-02-07 ENCOUNTER — OFFICE VISIT (OUTPATIENT)
Dept: FAMILY MEDICINE CLINIC | Facility: CLINIC | Age: 64
End: 2024-02-07
Payer: OTHER GOVERNMENT

## 2024-02-07 VITALS
SYSTOLIC BLOOD PRESSURE: 122 MMHG | TEMPERATURE: 97.5 F | WEIGHT: 150.2 LBS | HEIGHT: 63 IN | BODY MASS INDEX: 26.61 KG/M2 | DIASTOLIC BLOOD PRESSURE: 78 MMHG | OXYGEN SATURATION: 98 % | HEART RATE: 71 BPM

## 2024-02-07 DIAGNOSIS — R73.03 PREDIABETES: Primary | ICD-10-CM

## 2024-02-07 DIAGNOSIS — Z11.59 NEED FOR HEPATITIS C SCREENING TEST: ICD-10-CM

## 2024-02-07 DIAGNOSIS — H81.12 BPPV (BENIGN PAROXYSMAL POSITIONAL VERTIGO), LEFT: ICD-10-CM

## 2024-02-07 DIAGNOSIS — Z51.81 MEDICATION MONITORING ENCOUNTER: ICD-10-CM

## 2024-02-07 DIAGNOSIS — M19.049 CMC ARTHRITIS: ICD-10-CM

## 2024-02-07 DIAGNOSIS — Z12.31 VISIT FOR SCREENING MAMMOGRAM: ICD-10-CM

## 2024-02-07 DIAGNOSIS — E78.2 MIXED HYPERLIPIDEMIA: ICD-10-CM

## 2024-02-07 LAB
ALBUMIN SERPL-MCNC: 4.4 G/DL (ref 3.5–5.2)
ALBUMIN/GLOB SERPL: 1.4 G/DL
ALP SERPL-CCNC: 89 U/L (ref 39–117)
ALT SERPL W P-5'-P-CCNC: 12 U/L (ref 1–33)
ANION GAP SERPL CALCULATED.3IONS-SCNC: 13 MMOL/L (ref 5–15)
AST SERPL-CCNC: 27 U/L (ref 1–32)
BASOPHILS # BLD AUTO: 0.1 10*3/MM3 (ref 0–0.2)
BASOPHILS NFR BLD AUTO: 1.3 % (ref 0–1.5)
BILIRUB SERPL-MCNC: 0.4 MG/DL (ref 0–1.2)
BUN SERPL-MCNC: 16 MG/DL (ref 8–23)
BUN/CREAT SERPL: 23.9 (ref 7–25)
CALCIUM SPEC-SCNC: 9.5 MG/DL (ref 8.6–10.5)
CHLORIDE SERPL-SCNC: 103 MMOL/L (ref 98–107)
CHOLEST SERPL-MCNC: 191 MG/DL (ref 0–200)
CO2 SERPL-SCNC: 24 MMOL/L (ref 22–29)
CREAT SERPL-MCNC: 0.67 MG/DL (ref 0.57–1)
DEPRECATED RDW RBC AUTO: 41.3 FL (ref 37–54)
EGFRCR SERPLBLD CKD-EPI 2021: 97.7 ML/MIN/1.73
EOSINOPHIL # BLD AUTO: 0.12 10*3/MM3 (ref 0–0.4)
EOSINOPHIL NFR BLD AUTO: 1.6 % (ref 0.3–6.2)
ERYTHROCYTE [DISTWIDTH] IN BLOOD BY AUTOMATED COUNT: 13.2 % (ref 12.3–15.4)
GLOBULIN UR ELPH-MCNC: 3.2 GM/DL
GLUCOSE SERPL-MCNC: 90 MG/DL (ref 65–99)
HBA1C MFR BLD: 6.1 % (ref 4.8–5.6)
HCT VFR BLD AUTO: 44.7 % (ref 34–46.6)
HCV AB SER DONR QL: NORMAL
HDLC SERPL-MCNC: 45 MG/DL (ref 40–60)
HGB BLD-MCNC: 14.6 G/DL (ref 12–15.9)
IMM GRANULOCYTES # BLD AUTO: 0.01 10*3/MM3 (ref 0–0.05)
IMM GRANULOCYTES NFR BLD AUTO: 0.1 % (ref 0–0.5)
LDLC SERPL CALC-MCNC: 118 MG/DL (ref 0–100)
LDLC/HDLC SERPL: 2.54 {RATIO}
LYMPHOCYTES # BLD AUTO: 2.14 10*3/MM3 (ref 0.7–3.1)
LYMPHOCYTES NFR BLD AUTO: 28.1 % (ref 19.6–45.3)
MCH RBC QN AUTO: 28 PG (ref 26.6–33)
MCHC RBC AUTO-ENTMCNC: 32.7 G/DL (ref 31.5–35.7)
MCV RBC AUTO: 85.6 FL (ref 79–97)
MONOCYTES # BLD AUTO: 0.48 10*3/MM3 (ref 0.1–0.9)
MONOCYTES NFR BLD AUTO: 6.3 % (ref 5–12)
NEUTROPHILS NFR BLD AUTO: 4.77 10*3/MM3 (ref 1.7–7)
NEUTROPHILS NFR BLD AUTO: 62.6 % (ref 42.7–76)
NRBC BLD AUTO-RTO: 0 /100 WBC (ref 0–0.2)
PLATELET # BLD AUTO: 249 10*3/MM3 (ref 140–450)
PMV BLD AUTO: 9.8 FL (ref 6–12)
POTASSIUM SERPL-SCNC: 4 MMOL/L (ref 3.5–5.2)
PROT SERPL-MCNC: 7.6 G/DL (ref 6–8.5)
RBC # BLD AUTO: 5.22 10*6/MM3 (ref 3.77–5.28)
SODIUM SERPL-SCNC: 140 MMOL/L (ref 136–145)
TRIGL SERPL-MCNC: 158 MG/DL (ref 0–150)
VLDLC SERPL-MCNC: 28 MG/DL (ref 5–40)
WBC NRBC COR # BLD AUTO: 7.62 10*3/MM3 (ref 3.4–10.8)

## 2024-02-07 PROCEDURE — 80061 LIPID PANEL: CPT | Performed by: FAMILY MEDICINE

## 2024-02-07 PROCEDURE — 83036 HEMOGLOBIN GLYCOSYLATED A1C: CPT | Performed by: FAMILY MEDICINE

## 2024-02-07 PROCEDURE — 80053 COMPREHEN METABOLIC PANEL: CPT | Performed by: FAMILY MEDICINE

## 2024-02-07 PROCEDURE — 86803 HEPATITIS C AB TEST: CPT | Performed by: FAMILY MEDICINE

## 2024-02-07 PROCEDURE — 85025 COMPLETE CBC W/AUTO DIFF WBC: CPT | Performed by: FAMILY MEDICINE

## 2024-02-07 RX ORDER — IBUPROFEN 600 MG/1
600 TABLET ORAL 2 TIMES DAILY PRN
Qty: 180 TABLET | Refills: 1 | Status: SHIPPED | OUTPATIENT
Start: 2024-02-07

## 2024-02-07 NOTE — PROGRESS NOTES
"Chief Complaint  Arthritis and Follow-up    Subjective     {Problem List  Visit Diagnosis   Encounters  Notes  Medications  Labs  Result Review Imaging  Media :23}     Shereen Alanis presents to [facility]  History of Present Illness      Current Outpatient Medications   Medication Instructions    acetaminophen (TYLENOL) 325 MG tablet Tylenol 325 mg oral tablet take 1 tablet by oral route every 4 hours as needed   Suspended    cetirizine (ZYRTEC) 10 mg, Oral, Daily    cyclobenzaprine (FLEXERIL) 10 mg, Oral, 3 Times Daily PRN    Diclofenac Sodium (VOLTAREN) 4 g, Topical, 4 Times Daily PRN    fluticasone (FLONASE) 50 MCG/ACT nasal spray 2 sprays, Nasal, Daily    ibuprofen (ADVIL,MOTRIN) 600 mg, Oral, 2 Times Daily PRN    traMADol (ULTRAM) 50 mg, Oral, 2 Times Daily PRN       The following portions of the patient's history were reviewed and updated as appropriate: allergies, current medications, past family history, past medical history, past social history, past surgical history, and problem list.    Objective   Vital Signs:   /78 (BP Location: Left arm, Patient Position: Sitting, Cuff Size: Adult)   Pulse 71   Temp 97.5 °F (36.4 °C) (Temporal)   Ht 160 cm (63\")   Wt 68.1 kg (150 lb 3.2 oz)   SpO2 98%   BMI 26.61 kg/m²     BP Readings from Last 3 Encounters:   02/07/24 122/78   08/08/23 116/76   12/12/22 112/78     Wt Readings from Last 3 Encounters:   02/07/24 68.1 kg (150 lb 3.2 oz)   08/08/23 69.8 kg (153 lb 12.8 oz)   12/12/22 69.9 kg (154 lb 3.2 oz)     {BMI is >= 25 and <30. (Overweight) The following options were offered after discussion; (Optional):07192}    Physical Exam     [unfilled]    Result Review :{Labs  Result Review  Imaging  Med Tab  Media  Procedures :23}   The following data was reviewed by: Migdalia Andersen MA on 02/07/2024:  Common labs          8/10/2023    12:22   Common Labs   Glucose 108    BUN 12    Creatinine 0.62    Sodium 139  " "  Potassium 4.0    Chloride 105    Calcium 9.3    Albumin 4.0    Total Bilirubin 0.2    Alkaline Phosphatase 97    AST (SGOT) 31    ALT (SGPT) 13    WBC 8.48    Hemoglobin 13.7    Hematocrit 41.2    Platelets 204    Total Cholesterol 197    Triglycerides 253    HDL Cholesterol 40    LDL Cholesterol  113    Hemoglobin A1C 6.20        {Data reviewed (Optional):08089:::1}     No results found for: \"SARSANTIGEN\", \"COVID19\", \"RAPFLUA\", \"RAPFLUB\", \"FLUAAG\", \"FLUABDAG\", \"FLU\", \"FLUBAG\", \"RAPSCRN\", \"STREPAAG\", \"RSV\", \"POCPREGUR\", \"MONOSPOT\", \"INR\", \"LEADCAPBLD\", \"POCLEAD\", \"BILIRUBINUR\"    Procedures        Assessment and Plan {CC Problem List  Visit Diagnosis   ROS  Review (Popup)  Health Maintenance  Quality  BestPractice  Medications  SmartSets  SnapShot Encounters  Media :23}   There are no diagnoses linked to this encounter.      There are no discontinued medications.     {Time Spent (Optional):84207}  Follow Up {Instructions Charge Capture  Follow-up Communications :23}  No follow-ups on file.  Patient was given instructions and counseling regarding her condition or for health maintenance advice. Please see specific information pulled into the AVS if appropriate.       Migdalia Andersen MA  02/07/24  11:14 EST             "

## 2024-02-07 NOTE — PROGRESS NOTES
Chief Complaint  CMC arthritis  Prediabetes      Subjective          Iris JEAN MARIE Alanis presents to [facility]  History of Present Illness  Patient presents today to follow-up for CMC arthritis.  Overall her symptoms have been manageable at this time.  She previously had an injection for her left hand which helped out.  She does use Voltaren gel as well as ibuprofen as needed.  She is due for mammogram.  I previously gave her an order for this and she will get this done on [place].  She is traveling monthly out to [place] to spend time with her son and grandchildren.  On the last visit I diagnosed her with positional vertigo on the left side.  This has resolved now doing the Epley maneuver at home.  She was previously noted to be in the prediabetic range with her A1c at 6.2%.  We discussed having labs repeated again today.  Plan to also check a lipid panel.  Previously her LDL was 113 with triglycerides at 253.  I did discuss with patient working on lifestyle changes including diet and exercise.  She has a Tdap due on her care gap but she reports that she have this done.  I have asked her to bring in documentation of this.    Current Outpatient Medications   Medication Instructions    acetaminophen (TYLENOL) 325 MG tablet Tylenol 325 mg oral tablet take 1 tablet by oral route every 4 hours as needed   Suspended    cetirizine (ZYRTEC) 10 mg, Oral, Daily    cyclobenzaprine (FLEXERIL) 10 mg, Oral, 3 Times Daily PRN    Diclofenac Sodium (VOLTAREN) 4 g, Topical, 4 Times Daily PRN    fluticasone (FLONASE) 50 MCG/ACT nasal spray 2 sprays, Nasal, Daily    ibuprofen (ADVIL,MOTRIN) 600 mg, Oral, 2 Times Daily PRN    traMADol (ULTRAM) 50 mg, Oral, 2 Times Daily PRN       The following portions of the patient's history were reviewed and updated as appropriate: allergies, current medications, past family history, past medical history, past social history, past surgical history, and problem  "list.    Objective   Vital Signs:   /78 (BP Location: Left arm, Patient Position: Sitting, Cuff Size: Adult)   Pulse 71   Temp 97.5 °F (36.4 °C) (Temporal)   Ht 160 cm (63\")   Wt 68.1 kg (150 lb 3.2 oz)   SpO2 98%   BMI 26.61 kg/m²     BP Readings from Last 3 Encounters:   02/07/24 122/78   08/08/23 116/76   12/12/22 112/78     Wt Readings from Last 3 Encounters:   02/07/24 68.1 kg (150 lb 3.2 oz)   08/08/23 69.8 kg (153 lb 12.8 oz)   12/12/22 69.9 kg (154 lb 3.2 oz)         Physical Exam  Vitals reviewed.   Constitutional:       Appearance: Normal appearance.   HENT:      Head: Normocephalic and atraumatic.      Right Ear: External ear normal.      Left Ear: External ear normal.      Nose: Nose normal.   Eyes:      Conjunctiva/sclera: Conjunctivae normal.   Cardiovascular:      Rate and Rhythm: Normal rate and regular rhythm.      Heart sounds: No murmur heard.     No friction rub. No gallop.   Pulmonary:      Effort: Pulmonary effort is normal.      Breath sounds: Normal breath sounds. No wheezing or rhonchi.   Abdominal:      General: Bowel sounds are normal. There is no distension.      Palpations: Abdomen is soft.      Tenderness: There is no abdominal tenderness.   Skin:     General: Skin is warm and dry.   Neurological:      Mental Status: She is alert and oriented to person, place, and time.      Cranial Nerves: No cranial nerve deficit.   Psychiatric:         Mood and Affect: Mood and affect normal.         Behavior: Behavior normal.         Thought Content: Thought content normal.         Judgment: Judgment normal.          [unfilled]    Result Review :   The following data was reviewed by: Augusto Leung DO on 02/07/2024:  Common labs          8/10/2023    12:22   Common Labs   Glucose 108    BUN 12    Creatinine 0.62    Sodium 139    Potassium 4.0    Chloride 105    Calcium 9.3    Albumin 4.0    Total Bilirubin 0.2    Alkaline Phosphatase 97    AST (SGOT) 31    ALT (SGPT) 13    WBC 8.48  " "  Hemoglobin 13.7    Hematocrit 41.2    Platelets 204    Total Cholesterol 197    Triglycerides 253    HDL Cholesterol 40    LDL Cholesterol  113    Hemoglobin A1C 6.20             No results found for: \"SARSANTIGEN\", \"COVID19\", \"RAPFLUA\", \"RAPFLUB\", \"FLUAAG\", \"FLUABDAG\", \"FLU\", \"FLUBAG\", \"RAPSCRN\", \"STREPAAG\", \"RSV\", \"POCPREGUR\", \"MONOSPOT\", \"INR\", \"LEADCAPBLD\", \"POCLEAD\", \"BILIRUBINUR\"    Procedures        Assessment and Plan    Diagnoses and all orders for this visit:    1. Prediabetes (Primary)  -     CBC & Differential  -     Comprehensive Metabolic Panel  -     Hemoglobin A1c    2. BPPV (benign paroxysmal positional vertigo), left    3. CMC arthritis    4. Visit for screening mammogram    5. Mixed hyperlipidemia  -     Lipid Panel    6. Need for hepatitis C screening test  -     Hepatitis C Antibody    7. Medication monitoring encounter  -     Hepatitis C Antibody  -     CBC & Differential  -     Comprehensive Metabolic Panel  -     Hemoglobin A1c  -     Lipid Panel    Other orders  -     ibuprofen (ADVIL,MOTRIN) 600 MG tablet; Take 1 tablet by mouth 2 (Two) Times a Day As Needed for Moderate Pain.  Dispense: 180 tablet; Refill: 1    Plan as documented above.  I discussed with patient seeing her back in 6 months or sooner if needed.  Plan to have labs drawn today.  Further recommendations to follow once results return.  As far as CMC arthritis is concerned her symptoms are manageable at this time.  She is not interested in an injection.      Medications Discontinued During This Encounter   Medication Reason    ibuprofen (ADVIL,MOTRIN) 600 MG tablet Reorder          Follow Up   Return in about 6 months (around 8/7/2024) for prediabetes.  Patient was given instructions and counseling regarding her condition or for health maintenance advice. Please see specific information pulled into the AVS if appropriate.       Augusto Leung,   02/07/24  12:11 EST             "

## 2024-06-04 RX ORDER — CETIRIZINE HYDROCHLORIDE 10 MG/1
10 TABLET ORAL DAILY
Qty: 90 TABLET | Refills: 3 | Status: SHIPPED | OUTPATIENT
Start: 2024-06-04

## 2024-06-04 NOTE — TELEPHONE ENCOUNTER
Caller: Shereen Alanis    Relationship: Self    Best call back number: [phone]    Requested Prescriptions:   Requested Prescriptions     Pending Prescriptions Disp Refills    cetirizine (zyrTEC) 10 MG tablet 90 tablet 3     Sig: Take 1 tablet by mouth Daily.        Pharmacy where request should be sent: [address] [phone] [facility] [phone]      Last office visit with prescribing clinician: 2024   Last telemedicine visit with prescribing clinician: Visit date not found   Next office visit with prescribing clinician: 2024     Additional details provided by patient:      PATIENT SAID THE REFILLS      Does the patient have less than a 3 day supply:  [x] Yes  [] No    Would you like a call back once the refill request has been completed: [] Yes [x] No    If the office needs to give you a call back, can they leave a voicemail: [] Yes [x] No    Lauren Chaney Rep   24 09:58 EDT

## 2025-01-10 RX ORDER — FLUTICASONE PROPIONATE 50 MCG
2 SPRAY, SUSPENSION (ML) NASAL DAILY
Qty: 48 G | Refills: 3 | Status: SHIPPED | OUTPATIENT
Start: 2025-01-10

## 2025-01-10 RX ORDER — IBUPROFEN 600 MG/1
600 TABLET, FILM COATED ORAL 2 TIMES DAILY PRN
Qty: 180 TABLET | Refills: 1 | Status: SHIPPED | OUTPATIENT
Start: 2025-01-10

## 2025-01-10 NOTE — TELEPHONE ENCOUNTER
Caller: Shereen Alanis    Relationship: Self    Best call back number: [phone]     Requested Prescriptions:   Requested Prescriptions     Pending Prescriptions Disp Refills    fluticasone (FLONASE) 50 MCG/ACT nasal spray 48 g 3     Sig: Administer 2 sprays into the nostril(s) as directed by provider Daily.    ibuprofen (ADVIL,MOTRIN) 600 MG tablet 180 tablet 1     Sig: Take 1 tablet by mouth 2 (Two) Times a Day As Needed for Moderate Pain.        Pharmacy where request should be sent: [place] [phone] [place] [phone] [facility]     Last office visit with prescribing clinician: 2/7/2024   Last telemedicine visit with prescribing clinician: Visit date not found   Next office visit with prescribing clinician: 4/2/2025         Does the patient have less than a 3 day supply:  [x] Yes  [] No        Lauren Barkley Rep   01/10/25 09:25 EST

## 2025-03-03 ENCOUNTER — OFFICE VISIT (OUTPATIENT)
Dept: FAMILY MEDICINE CLINIC | Facility: CLINIC | Age: 65
End: 2025-03-03
Payer: MEDICARE

## 2025-03-03 VITALS
HEIGHT: 63 IN | SYSTOLIC BLOOD PRESSURE: 126 MMHG | OXYGEN SATURATION: 97 % | WEIGHT: 146 LBS | HEART RATE: 85 BPM | DIASTOLIC BLOOD PRESSURE: 76 MMHG | BODY MASS INDEX: 25.87 KG/M2 | TEMPERATURE: 98.6 F

## 2025-03-03 DIAGNOSIS — B34.9 ACUTE VIRAL SYNDROME: Primary | ICD-10-CM

## 2025-03-03 DIAGNOSIS — J02.9 PHARYNGITIS, UNSPECIFIED ETIOLOGY: ICD-10-CM

## 2025-03-03 DIAGNOSIS — J30.89 NON-SEASONAL ALLERGIC RHINITIS, UNSPECIFIED TRIGGER: ICD-10-CM

## 2025-03-03 LAB
EXPIRATION DATE: NORMAL
FLUAV AG UPPER RESP QL IA.RAPID: NOT DETECTED
FLUBV AG UPPER RESP QL IA.RAPID: NOT DETECTED
INTERNAL CONTROL: NORMAL
Lab: NORMAL
SARS-COV-2 AG UPPER RESP QL IA.RAPID: NOT DETECTED

## 2025-03-03 RX ORDER — MOMETASONE FUROATE MONOHYDRATE 50 UG/1
2 SPRAY, METERED NASAL DAILY
Qty: 17 G | Refills: 12 | Status: SHIPPED | OUTPATIENT
Start: 2025-03-03

## 2025-03-03 RX ORDER — BROMPHENIRAMINE MALEATE, PSEUDOEPHEDRINE HYDROCHLORIDE, AND DEXTROMETHORPHAN HYDROBROMIDE 2; 30; 10 MG/5ML; MG/5ML; MG/5ML
10 SYRUP ORAL 4 TIMES DAILY PRN
Qty: 473 ML | Refills: 0 | Status: SHIPPED | OUTPATIENT
Start: 2025-03-03

## 2025-03-03 RX ORDER — FEXOFENADINE HCL 180 MG/1
180 TABLET ORAL DAILY
Qty: 30 TABLET | Refills: 1 | Status: SHIPPED | OUTPATIENT
Start: 2025-03-03

## 2025-03-03 NOTE — PROGRESS NOTES
Chief Complaint  Sore Throat, Cough, Nasal Congestion, Headache, and Chills    Subjective      Shereen Alanis is a 64 y.o. female who presents to [facility]    History of Present Illness  The patient is a 64-year-old female who presents today for acute evaluation of sore throat, cough, chills, headache, and sinus drainage.    She reports that symptoms started a couple of days before she went to urgent care on 02/17/2025, where she was diagnosed with strep throat and tested negative for influenza and COVID-19 at that time. Since then, she has completed her amoxicillin course, but symptoms have not completely resolved. She was seen by Ms. Zabala at the urgent care on 02/17/2025, where she was diagnosed with strep throat and prescribed amoxicillin. Following the completion of the antibiotic course, she experienced a brief period of relief before the recurrence of symptoms, although less severe than initially. She continues to experience throat discomfort, headaches, and chills, which started 2 days ago. Additionally, she reports a cough that began a few days ago and significant sinus drainage. She did not have a fever during her strep throat episode and currently does not have any fever, nausea, vomiting, or diarrhea. However, she does report body aches and weight loss, although not as significant as the 8 pounds indicated by our scale.    She has been managing her allergies with Flonase and Zyrtec for several years, but these medications no longer seem effective. She does not recall ever using Allegra. She underwent a 4-year course of allergy injections, which she discontinued, leading to a resurgence of her symptoms.    SOCIAL HISTORY  She is a nonsmoker.    MEDICATIONS  - Current: Flonase  - Current: Zyrtec  - Current: Amoxicillin        Patient Care Team:  Augusto Leung DO as PCP - General (Family Medicine)    Objective   Vital Signs:   Vitals:    03/03/25 1137   BP: 126/76  "  Pulse: 85   Temp: 98.6 °F (37 °C)   SpO2: 97%   Weight: 66.2 kg (146 lb)   Height: 160 cm (63\")     Body mass index is 25.86 kg/m².    Wt Readings from Last 3 Encounters:   03/03/25 66.2 kg (146 lb)   02/17/25 69.9 kg (154 lb)   02/07/24 68.1 kg (150 lb 3.2 oz)     BP Readings from Last 3 Encounters:   03/03/25 126/76   02/17/25 141/78   02/07/24 122/78       Health Maintenance   Topic Date Due    DXA SCAN  Never done    TDAP/TD VACCINES (1 - Tdap) Never done    Pneumococcal Vaccine 50+ (1 of 1 - PCV) Never done    ZOSTER VACCINE (1 of 2) Never done    ANNUAL WELLNESS VISIT  Never done    BMI FOLLOWUP  11/15/2022    INFLUENZA VACCINE  07/01/2024    COVID-19 Vaccine (3 - 2024-25 season) 09/01/2024    LIPID PANEL  02/07/2025    MAMMOGRAM  12/20/2024    COLORECTAL CANCER SCREENING  01/11/2026    HEPATITIS C SCREENING  Completed       Lab Results (last 24 hours)       ** No results found for the last 24 hours. **               Physical Exam  Vitals and nursing note reviewed.   Constitutional:       General: She is not in acute distress.     Appearance: Normal appearance. She is not ill-appearing.   HENT:      Head: Normocephalic and atraumatic.      Right Ear: Ear canal and external ear normal. There is no impacted cerumen.      Left Ear: Ear canal and external ear normal. There is no impacted cerumen.      Ears:      Comments: Small effusions and bulging without erythema.     Nose: Congestion and rhinorrhea present.      Mouth/Throat:      Mouth: Mucous membranes are moist.      Pharynx: Posterior oropharyngeal erythema (posterior oropharynx, mild cobblestoning) present. No oropharyngeal exudate.   Eyes:      General:         Right eye: No discharge.         Left eye: No discharge.      Extraocular Movements: Extraocular movements intact.      Conjunctiva/sclera: Conjunctivae normal.   Cardiovascular:      Rate and Rhythm: Normal rate and regular rhythm.      Heart sounds: Normal heart sounds.   Pulmonary:      " Effort: Pulmonary effort is normal.      Breath sounds: Normal breath sounds.   Lymphadenopathy:      Cervical: Cervical adenopathy (anterior with mild ttp) present.   Skin:     General: Skin is warm and dry.   Neurological:      General: No focal deficit present.      Mental Status: She is alert and oriented to person, place, and time.   Psychiatric:         Mood and Affect: Mood normal.         Behavior: Behavior normal.          Physical Exam  General Appearance: Normal.  Vital signs: Vital signs are normal. BMI is over 25.  HEENT: Ears appear normal with no redness. There is no swelling in the tonsils, but there is some irritation and redness at the back of the throat, likely from drainage. No significant sinus pain.  Respiratory: Lungs sound normal.  Cardiovascular: Heart sounds are normal.  Lymphatic: Anterior cervical lymph nodes are slightly swollen, more so on one side than the other.  Skin: Warm and dry, no rash.  Neurological: Normal.      Result Review   The following data was reviewed by: Cielo Yip MD on 03/03/2025:  [x]  Tests & Results  []  Hospitalization/Emergency Department/Urgent Care  []  Internal/External Consultant Notes    Results  - Laboratory Studies:    - Strep test: Positive    - Influenza test: Negative    - COVID-19 test: Negative      Procedures          ASSESSMENT/PLAN  Diagnoses and all orders for this visit:    1. Acute viral syndrome (Primary)  -     POCT SARS-CoV-2 + Flu Antigen NICKY    2. Pharyngitis, unspecified etiology    3. Non-seasonal allergic rhinitis, unspecified trigger    Other orders  -     mometasone (Nasonex) 50 MCG/ACT nasal spray; Administer 2 sprays into the nostril(s) as directed by provider Daily.  Dispense: 17 g; Refill: 12  -     fexofenadine (Allegra Allergy) 180 MG tablet; Take 1 tablet by mouth Daily.  Dispense: 30 tablet; Refill: 1  -     brompheniramine-pseudoephedrine-DM 30-2-10 MG/5ML syrup; Take 10 mL by mouth 4 (Four) Times a Day As Needed for  Cough or Allergies.  Dispense: 473 mL; Refill: 0        Assessment & Plan  1. Pharyngitis.  Her symptoms, including sore throat, cough, chills, headache, and sinus drainage, suggest a potential viral infection, possibly influenza, superimposed on her existing allergies. She has completed a course of amoxicillin for a previously diagnosed strep throat, but symptoms have not fully resolved. A swab test for influenza and COVID-19 will be conducted today. She will be transitioned from Zyrtec to Allegra 180 mg once daily, with a prescription for a one-month supply and one refill. Nasonex will be prescribed as an alternative to Flonase. For acute allergy symptoms, Bromfed DM cough syrup will be provided, which contains an antihistamine and decongestant. She is advised not to take Allegra while using the cough syrup due to overlapping antihistamine content.    2. Allergic rhinitis.  She reports that her current medications, Flonase and Zyrtec, are no longer effective. She will be switched to Allegra 180 mg once daily and Nasonex. A prescription for a one-month supply of Allegra with one refill will be provided. She is advised to monitor her symptoms and follow up if there is no improvement.        BMI is >= 25 and <30. (Overweight) The following options were offered after discussion;: nutrition counseling/recommendations       Shereen Alanis  reports that she has never smoked. She has never used smokeless tobacco.                FOLLOW UP  No follow-ups on file.  Patient was given instructions and counseling regarding her condition or for health maintenance advice. Please see specific information pulled into the AVS if appropriate.       Cielo Yip MD  03/04/25  17:52 EST    Part of this note may be an electronic transcription/translation of spoken language to printed text using the Dragon Dictation System.    Patient or patient representative verbalized consent for the use of Ambient Listening during the visit with   Cielo Yip MD for chart documentation. 3/4/2025  12:25 EST

## 2025-03-05 ENCOUNTER — TELEPHONE (OUTPATIENT)
Dept: FAMILY MEDICINE CLINIC | Facility: CLINIC | Age: 65
End: 2025-03-05
Payer: MEDICARE

## 2025-03-05 NOTE — TELEPHONE ENCOUNTER
Caller: Shereen Alanis    Relationship: Self    Best call back number:     [phone]      What medication are you requesting: ANTIBIOTIC    What are your current symptoms: COUGHING UP YELLOW AND GREEN MUCUS- HEADACHE    How long have you been experiencing symptoms: PATIENT STATES THAT ONCE SHE FINISHED FIRST ROUND OF ANTIBIOTICS THE MUCUS WAS CLEAR HOWEVER IT HAS SENSE STARTED HAVING THE COLOR OF GREEN AND YELLOW BACK. STATES THE SYMPTOMS STARTED BACK ON SATURDAY 3.1.2025    Have you had these symptoms before:    [x] Yes  [] No    Have you been treated for these symptoms before:   [x] Yes  [] No    If a prescription is needed, what is your preferred pharmacy and phone number: [facility] - [address] [phone] [facility] [phone]      Additional notes: PATIENT STATES WAS SEEN AT [facility] FIRST TWO WEEKS AGO WAS DIAGNOSED WITH STREP THROAT, PATIENT CAME INTO OUR OFFICE ON 3.3.2025 WAS SEEN BY MD CROSS, PATIENT STATES THERE WERE SEVERAL TEST RUN HOWEVER A STREP WAS NOT. PATIENT FEELS LIKE SHE IS STILL EXPERIENCING THE SYMPTOMS OF STREP THROAT AND WOULD LIKE A PRESCRIPTION ORDERED.

## 2025-03-06 RX ORDER — AZITHROMYCIN 250 MG/1
TABLET, FILM COATED ORAL
Qty: 6 TABLET | Refills: 0 | Status: SHIPPED | OUTPATIENT
Start: 2025-03-06

## 2025-03-06 NOTE — TELEPHONE ENCOUNTER
I will send in a prescription for azithromycin.  If patient continues to have symptoms she needs to come in to be seen.  Prescription has been sent to [place].

## 2025-03-06 NOTE — TELEPHONE ENCOUNTER
DEAN  Left voicemail to return call to the office. Prescription has been sent into [facility] pharmacy. If symptoms continue, patient needs to schedule an appointment to come in for a visit.

## 2025-03-06 NOTE — TELEPHONE ENCOUNTER
Name: Shereen Alanis    Relationship: Self    Best Callback Number: [phone]    HUB PROVIDED THE RELAY MESSAGE FROM THE OFFICE   PATIENT VOICED UNDERSTANDING AND HAS NO FURTHER QUESTIONS AT THIS TIME    ADDITIONAL INFORMATION: HUB TO RELAY WAS READ TO PATIENT VERBATIM. PATIENT HAD NO FURTHER QUESTIONS AT THIS TIME. SHE WILL  HER PRESCRIPTION.

## 2025-03-10 NOTE — TELEPHONE ENCOUNTER
Called and spoke with patient, patient states they are feeling better and do not need another appointment a this time but will call if they need to be seen sooner then April.

## 2025-05-01 ENCOUNTER — OFFICE VISIT (OUTPATIENT)
Dept: FAMILY MEDICINE CLINIC | Facility: CLINIC | Age: 65
End: 2025-05-01
Payer: MEDICARE

## 2025-05-01 VITALS
OXYGEN SATURATION: 95 % | HEIGHT: 63 IN | SYSTOLIC BLOOD PRESSURE: 124 MMHG | WEIGHT: 151.7 LBS | HEART RATE: 75 BPM | BODY MASS INDEX: 26.88 KG/M2 | DIASTOLIC BLOOD PRESSURE: 84 MMHG | TEMPERATURE: 98 F

## 2025-05-01 DIAGNOSIS — R73.03 PREDIABETES: ICD-10-CM

## 2025-05-01 DIAGNOSIS — E61.1 IRON DEFICIENCY: ICD-10-CM

## 2025-05-01 DIAGNOSIS — Z12.31 VISIT FOR SCREENING MAMMOGRAM: ICD-10-CM

## 2025-05-01 DIAGNOSIS — E55.9 VITAMIN D DEFICIENCY: ICD-10-CM

## 2025-05-01 DIAGNOSIS — E78.2 MIXED HYPERLIPIDEMIA: ICD-10-CM

## 2025-05-01 DIAGNOSIS — J30.89 NON-SEASONAL ALLERGIC RHINITIS, UNSPECIFIED TRIGGER: Primary | ICD-10-CM

## 2025-05-01 DIAGNOSIS — N95.1 VASOMOTOR SYMPTOMS DUE TO MENOPAUSE: ICD-10-CM

## 2025-05-01 DIAGNOSIS — E53.8 FOLATE DEFICIENCY: ICD-10-CM

## 2025-05-01 DIAGNOSIS — Z78.0 POSTMENOPAUSAL: ICD-10-CM

## 2025-05-01 DIAGNOSIS — Z28.39 IMMUNIZATION DEFICIENCY: ICD-10-CM

## 2025-05-01 DIAGNOSIS — E53.8 B12 DEFICIENCY: ICD-10-CM

## 2025-05-01 RX ORDER — FEXOFENADINE HCL 180 MG/1
180 TABLET ORAL DAILY
Qty: 90 TABLET | Refills: 3 | Status: SHIPPED | OUTPATIENT
Start: 2025-05-01

## 2025-05-01 RX ORDER — IBUPROFEN 600 MG/1
600 TABLET, FILM COATED ORAL 2 TIMES DAILY PRN
Qty: 180 TABLET | Refills: 1 | Status: SHIPPED | OUTPATIENT
Start: 2025-05-01

## 2025-05-01 RX ORDER — MOMETASONE FUROATE MONOHYDRATE 50 UG/1
2 SPRAY, METERED NASAL DAILY
Qty: 51 G | Refills: 3 | Status: SHIPPED | OUTPATIENT
Start: 2025-05-01

## 2025-05-01 NOTE — PROGRESS NOTES
Chief Complaint  HLD    Subjective          Shereen Alanis presents to [facility]    History of Present Illness     History of Present Illness  The patient is a 64-year-old female who presents today for a 6-month follow-up.    She has been managing her cholesterol levels independently without the use of any medication. Interest in having vitamin D levels checked is expressed. A TB vaccine was previously received through her workplace, and she has declined the tetanus, pneumonia, and influenza vaccines at this time. She has never contracted chickenpox and is agreeable to having her antibodies tested for the virus.    Hot flashes are reported, particularly severe during the summer months. She had previously been on medroxyprogesterone for 8 years, which resulted in significant weight gain. The medication was discontinued 5 years ago, and she is currently not on any treatment for hot flashes.    A mammogram has not been undergone in several years, and no current breast complaints are reported.    Prediabetes is noted.    Refills for Allegra and Nasonex are requested. Illness was experienced from 12/2024 to 02/2025, necessitating visits to urgent care twice. Dr. Yip was seen twice and treated her for pharyngitis with a Z-Cliff. She is feeling better now. Strep throat was diagnosed at urgent care, and antibiotics were administered.         Current Outpatient Medications   Medication Instructions    acetaminophen (TYLENOL) 325 MG tablet Tylenol 325 mg oral tablet take 1 tablet by oral route every 4 hours as needed   Suspended    Diclofenac Sodium (VOLTAREN) 4 g, Topical, 4 Times Daily PRN    fexofenadine (ALLEGRA ALLERGY) 180 mg, Oral, Daily    ibuprofen (ADVIL,MOTRIN) 600 mg, Oral, 2 Times Daily PRN    mometasone (Nasonex) 50 MCG/ACT nasal spray 2 sprays, Nasal, Daily       The following portions of the patient's history were reviewed and updated as appropriate: allergies, current  "medications, past family history, past medical history, past social history, past surgical history, and problem list.    Objective   Vital Signs:   /84   Pulse 75   Temp 98 °F (36.7 °C) (Oral)   Ht 160 cm (63\")   Wt 68.8 kg (151 lb 11.2 oz)   SpO2 95%   BMI 26.87 kg/m²     BP Readings from Last 3 Encounters:   05/01/25 124/84   03/03/25 126/76   02/17/25 141/78     Wt Readings from Last 3 Encounters:   05/01/25 68.8 kg (151 lb 11.2 oz)   03/03/25 66.2 kg (146 lb)   02/17/25 69.9 kg (154 lb)           Physical Exam  Vitals reviewed.   Constitutional:       Appearance: Normal appearance.   HENT:      Head: Normocephalic and atraumatic.      Right Ear: External ear normal.      Left Ear: External ear normal.      Nose: Nose normal.   Eyes:      Conjunctiva/sclera: Conjunctivae normal.   Cardiovascular:      Rate and Rhythm: Normal rate and regular rhythm.      Heart sounds: No murmur heard.     No friction rub. No gallop.   Pulmonary:      Effort: Pulmonary effort is normal.      Breath sounds: Normal breath sounds. No wheezing or rhonchi.   Abdominal:      General: Bowel sounds are normal. There is no distension.      Palpations: Abdomen is soft.      Tenderness: There is no abdominal tenderness.   Skin:     General: Skin is warm and dry.   Neurological:      Mental Status: She is alert and oriented to person, place, and time.      Cranial Nerves: No cranial nerve deficit.   Psychiatric:         Mood and Affect: Mood and affect normal.         Behavior: Behavior normal.         Thought Content: Thought content normal.         Judgment: Judgment normal.            Result Review :   The following data was reviewed by: Augusto Leung DO on 05/01/2025:           Lab Results   Component Value Date    SARSANTIGEN Not Detected 03/03/2025    FLUAAG Not Detected 03/03/2025    FLUBAG Not Detected 03/03/2025    RAPSCRN Positive (A) 02/17/2025       Results      Procedures        Assessment and Plan    Diagnoses " and all orders for this visit:    1. Non-seasonal allergic rhinitis, unspecified trigger (Primary)    2. Visit for screening mammogram  -     Mammo Screening Digital Tomosynthesis Bilateral With CAD; Future    3. Mixed hyperlipidemia  -     Lipid Panel; Future    4. Prediabetes  -     CBC & Differential; Future  -     Comprehensive Metabolic Panel; Future  -     Hemoglobin A1c; Future    5. Vitamin D deficiency  -     Vitamin D,25-Hydroxy; Future    6. Iron deficiency  -     Iron Profile; Future    7. B12 deficiency  -     Vitamin B12; Future    8. Folate deficiency  -     Folate; Future    9. Postmenopausal  -     DEXA Bone Density Axial; Future    10. Vasomotor symptoms due to menopause    11. Immunization deficiency  -     Varicella zoster antibody, IgG; Future    Other orders  -     fexofenadine (Allegra Allergy) 180 MG tablet; Take 1 tablet by mouth Daily.  Dispense: 90 tablet; Refill: 3  -     mometasone (Nasonex) 50 MCG/ACT nasal spray; Administer 2 sprays into the nostril(s) as directed by provider Daily.  Dispense: 51 g; Refill: 3  -     ibuprofen (ADVIL,MOTRIN) 600 MG tablet; Take 1 tablet by mouth 2 (Two) Times a Day As Needed for Moderate Pain.  Dispense: 180 tablet; Refill: 1        Assessment & Plan  1. Health maintenance.  - Due for a mammogram, which has not been conducted in the past 3 years.  - Bone density test recommended at her age to rule out osteoporosis.  - Declined tetanus shot and pneumonia vaccination at this time.  - Interested in checking antibodies for the chickenpox virus.  - Order for mammogram will be placed. Laboratory tests including CBC, CMP, vitamin D, lipid panel, iron, B12, and folate will be ordered. Bone density test will be scheduled. Antibodies for the chickenpox virus will be checked.    2. Hot flashes.  - Continues to experience hot flashes, especially aggravated by heat.  - Previously on hormone therapy but discontinued due to weight gain.  - Veozah, a non-hormonal  medication for hot flashes, was discussed.  - Will consider this option and inform the clinic if she decides to proceed.    3. Prediabetes.  - Needs to monitor blood sugar levels.  - Continued lifestyle modifications and monitoring recommended.  - Will follow up with lab work including CBC, CMP, vitamin D, lipid panel, iron, B12, and folate.    4. Medication management.  - Refills for Allegra and Nasonex will be sent to her pharmacy.  - Prescription drug monitoring program not discussed.    Follow-up  - Follow up in 6 months.       Medications Discontinued During This Encounter   Medication Reason    azithromycin (Zithromax Z-Cliff) 250 MG tablet *Therapy completed    brompheniramine-pseudoephedrine-DM 30-2-10 MG/5ML syrup *Therapy completed    ibuprofen (ADVIL,MOTRIN) 600 MG tablet Reorder    mometasone (Nasonex) 50 MCG/ACT nasal spray Reorder    fexofenadine (Allegra Allergy) 180 MG tablet Reorder          Follow Up   Return in about 6 months (around 11/1/2025) for HLD.  Patient was given instructions and counseling regarding her condition or for health maintenance advice. Please see specific information pulled into the AVS if appropriate.     Patient or patient representative verbalized consent for the use of Ambient Listening during the visit with  Augusto Leung DO for chart documentation. 5/1/2025  15:38 EDT    Augusto Leung DO  05/01/25  15:55 EDT

## 2025-05-08 ENCOUNTER — TELEPHONE (OUTPATIENT)
Dept: FAMILY MEDICINE CLINIC | Facility: CLINIC | Age: 65
End: 2025-05-08
Payer: MEDICARE

## 2025-05-08 NOTE — TELEPHONE ENCOUNTER
Caller: Shereen Alanis    Relationship: Self    Best call back number: [phone]     Requested Prescriptions:   Requested Prescriptions     Pending Prescriptions Disp Refills    mometasone (Nasonex) 50 MCG/ACT nasal spray 51 g 3     Sig: Administer 2 sprays into the nostril(s) as directed by provider Daily.    ibuprofen (ADVIL,MOTRIN) 600 MG tablet 180 tablet 1     Sig: Take 1 tablet by mouth 2 (Two) Times a Day As Needed for Moderate Pain.    fexofenadine (Allegra Allergy) 180 MG tablet 90 tablet 3     Sig: Take 1 tablet by mouth Daily.        Pharmacy where request should be sent: [address] [phone] [facility] [phone]      Last office visit with prescribing clinician: 5/1/2025   Last telemedicine visit with prescribing clinician: Visit date not found   Next office visit with prescribing clinician: 11/3/2025     Additional details provided by patient: SENT TO INCORRECT PHARMACY.     Does the patient have less than a 3 day supply:  [x] Yes  [] No    Would you like a call back once the refill request has been completed: [] Yes [x] No    If the office needs to give you a call back, can they leave a voicemail: [] Yes [x] No    Lauren Astorga Rep   05/08/25 11:55 EDT

## 2025-05-09 RX ORDER — FEXOFENADINE HCL 180 MG/1
180 TABLET ORAL DAILY
Qty: 90 TABLET | Refills: 3 | Status: SHIPPED | OUTPATIENT
Start: 2025-05-09

## 2025-05-09 RX ORDER — MOMETASONE FUROATE MONOHYDRATE 50 UG/1
2 SPRAY, METERED NASAL DAILY
Qty: 51 G | Refills: 3 | Status: SHIPPED | OUTPATIENT
Start: 2025-05-09

## 2025-05-09 RX ORDER — IBUPROFEN 600 MG/1
600 TABLET, FILM COATED ORAL 2 TIMES DAILY PRN
Qty: 180 TABLET | Refills: 1 | Status: SHIPPED | OUTPATIENT
Start: 2025-05-09

## 2025-05-09 NOTE — TELEPHONE ENCOUNTER
HUB TO RELAY    Medication sent to [facility] pharmacy for patient.

## 2025-05-28 ENCOUNTER — HOSPITAL ENCOUNTER (OUTPATIENT)
Dept: BONE DENSITY | Facility: HOSPITAL | Age: 65
Discharge: HOME | End: 2025-05-28
Admitting: FAMILY MEDICINE
Payer: MEDICARE

## 2025-05-28 ENCOUNTER — CLINICAL SUPPORT (OUTPATIENT)
Dept: FAMILY MEDICINE CLINIC | Facility: CLINIC | Age: 65
End: 2025-05-28
Payer: MEDICARE

## 2025-05-28 DIAGNOSIS — E61.1 IRON DEFICIENCY: ICD-10-CM

## 2025-05-28 DIAGNOSIS — E55.9 VITAMIN D DEFICIENCY: ICD-10-CM

## 2025-05-28 DIAGNOSIS — E53.8 FOLATE DEFICIENCY: ICD-10-CM

## 2025-05-28 DIAGNOSIS — Z78.0 POSTMENOPAUSAL: ICD-10-CM

## 2025-05-28 DIAGNOSIS — R73.03 PREDIABETES: ICD-10-CM

## 2025-05-28 DIAGNOSIS — Z28.39 IMMUNIZATION DEFICIENCY: ICD-10-CM

## 2025-05-28 DIAGNOSIS — E53.8 B12 DEFICIENCY: ICD-10-CM

## 2025-05-28 DIAGNOSIS — E78.2 MIXED HYPERLIPIDEMIA: ICD-10-CM

## 2025-05-28 LAB
25(OH)D3 SERPL-MCNC: 14.2 NG/ML (ref 30–100)
ALBUMIN SERPL-MCNC: 4.2 G/DL (ref 3.5–5.2)
ALBUMIN/GLOB SERPL: 1.5 G/DL
ALP SERPL-CCNC: 86 U/L (ref 39–117)
ALT SERPL W P-5'-P-CCNC: 12 U/L (ref 1–33)
ANION GAP SERPL CALCULATED.3IONS-SCNC: 8.1 MMOL/L (ref 5–15)
AST SERPL-CCNC: 32 U/L (ref 1–32)
BASOPHILS # BLD AUTO: 0.11 10*3/MM3 (ref 0–0.2)
BASOPHILS NFR BLD AUTO: 1.2 % (ref 0–1.5)
BILIRUB SERPL-MCNC: 0.3 MG/DL (ref 0–1.2)
BUN SERPL-MCNC: 14 MG/DL (ref 8–23)
BUN/CREAT SERPL: 20.9 (ref 7–25)
CALCIUM SPEC-SCNC: 9.5 MG/DL (ref 8.6–10.5)
CHLORIDE SERPL-SCNC: 104 MMOL/L (ref 98–107)
CHOLEST SERPL-MCNC: 240 MG/DL (ref 0–200)
CO2 SERPL-SCNC: 26.9 MMOL/L (ref 22–29)
CREAT SERPL-MCNC: 0.67 MG/DL (ref 0.57–1)
DEPRECATED RDW RBC AUTO: 43.6 FL (ref 37–54)
EGFRCR SERPLBLD CKD-EPI 2021: 97.1 ML/MIN/1.73
EOSINOPHIL # BLD AUTO: 0.23 10*3/MM3 (ref 0–0.4)
EOSINOPHIL NFR BLD AUTO: 2.5 % (ref 0.3–6.2)
ERYTHROCYTE [DISTWIDTH] IN BLOOD BY AUTOMATED COUNT: 13.5 % (ref 12.3–15.4)
FOLATE SERPL-MCNC: 10.6 NG/ML (ref 4.78–24.2)
GLOBULIN UR ELPH-MCNC: 2.8 GM/DL
GLUCOSE SERPL-MCNC: 102 MG/DL (ref 65–99)
HBA1C MFR BLD: 6.2 % (ref 4.8–5.6)
HCT VFR BLD AUTO: 43.3 % (ref 34–46.6)
HDLC SERPL-MCNC: 33 MG/DL (ref 40–60)
HGB BLD-MCNC: 13.6 G/DL (ref 12–15.9)
IMM GRANULOCYTES # BLD AUTO: 0.04 10*3/MM3 (ref 0–0.05)
IMM GRANULOCYTES NFR BLD AUTO: 0.4 % (ref 0–0.5)
IRON 24H UR-MRATE: 111 MCG/DL (ref 37–145)
IRON SATN MFR SERPL: 31 % (ref 20–50)
LDLC SERPL CALC-MCNC: 135 MG/DL (ref 0–100)
LDLC/HDLC SERPL: 3.88 {RATIO}
LYMPHOCYTES # BLD AUTO: 3.26 10*3/MM3 (ref 0.7–3.1)
LYMPHOCYTES NFR BLD AUTO: 35.5 % (ref 19.6–45.3)
MCH RBC QN AUTO: 27.8 PG (ref 26.6–33)
MCHC RBC AUTO-ENTMCNC: 31.4 G/DL (ref 31.5–35.7)
MCV RBC AUTO: 88.4 FL (ref 79–97)
MONOCYTES # BLD AUTO: 0.53 10*3/MM3 (ref 0.1–0.9)
MONOCYTES NFR BLD AUTO: 5.8 % (ref 5–12)
NEUTROPHILS NFR BLD AUTO: 5.02 10*3/MM3 (ref 1.7–7)
NEUTROPHILS NFR BLD AUTO: 54.6 % (ref 42.7–76)
NRBC BLD AUTO-RTO: 0 /100 WBC (ref 0–0.2)
PLATELET # BLD AUTO: 224 10*3/MM3 (ref 140–450)
PMV BLD AUTO: 10 FL (ref 6–12)
POTASSIUM SERPL-SCNC: 4.2 MMOL/L (ref 3.5–5.2)
PROT SERPL-MCNC: 7 G/DL (ref 6–8.5)
RBC # BLD AUTO: 4.9 10*6/MM3 (ref 3.77–5.28)
SODIUM SERPL-SCNC: 139 MMOL/L (ref 136–145)
TIBC SERPL-MCNC: 359 MCG/DL (ref 298–536)
TRANSFERRIN SERPL-MCNC: 241 MG/DL (ref 200–360)
TRIGL SERPL-MCNC: 395 MG/DL (ref 0–150)
VIT B12 BLD-MCNC: 641 PG/ML (ref 211–946)
VLDLC SERPL-MCNC: 72 MG/DL (ref 5–40)
WBC NRBC COR # BLD AUTO: 9.19 10*3/MM3 (ref 3.4–10.8)

## 2025-05-28 PROCEDURE — 80061 LIPID PANEL: CPT | Performed by: FAMILY MEDICINE

## 2025-05-28 PROCEDURE — 82306 VITAMIN D 25 HYDROXY: CPT | Performed by: FAMILY MEDICINE

## 2025-05-28 PROCEDURE — 83036 HEMOGLOBIN GLYCOSYLATED A1C: CPT | Performed by: FAMILY MEDICINE

## 2025-05-28 PROCEDURE — 82746 ASSAY OF FOLIC ACID SERUM: CPT | Performed by: FAMILY MEDICINE

## 2025-05-28 PROCEDURE — 83540 ASSAY OF IRON: CPT | Performed by: FAMILY MEDICINE

## 2025-05-28 PROCEDURE — 77080 DXA BONE DENSITY AXIAL: CPT

## 2025-05-28 PROCEDURE — 84466 ASSAY OF TRANSFERRIN: CPT | Performed by: FAMILY MEDICINE

## 2025-05-28 PROCEDURE — 80053 COMPREHEN METABOLIC PANEL: CPT | Performed by: FAMILY MEDICINE

## 2025-05-28 PROCEDURE — 86787 VARICELLA-ZOSTER ANTIBODY: CPT | Performed by: FAMILY MEDICINE

## 2025-05-28 PROCEDURE — 36415 COLL VENOUS BLD VENIPUNCTURE: CPT | Performed by: FAMILY MEDICINE

## 2025-05-28 PROCEDURE — 85025 COMPLETE CBC W/AUTO DIFF WBC: CPT | Performed by: FAMILY MEDICINE

## 2025-05-28 PROCEDURE — 82607 VITAMIN B-12: CPT | Performed by: FAMILY MEDICINE

## 2025-05-29 LAB — VZV IGG SER QL IA: REACTIVE

## 2025-06-26 RX ORDER — ERGOCALCIFEROL 1.25 MG/1
50000 CAPSULE, LIQUID FILLED ORAL WEEKLY
Qty: 13 CAPSULE | Refills: 3 | Status: SHIPPED | OUTPATIENT
Start: 2025-06-26 | End: 2025-06-27 | Stop reason: SDUPTHER

## 2025-06-26 NOTE — TELEPHONE ENCOUNTER
Caller: Paxton   Relationship: spouse  Best call back number: [phone]  Who are you requesting to speak with (clinical staff, provider, specific staff member):    MA     What was the call regarding:    Can the vitamin D be sent to [facility] please? It's showing up as [facility] now and they do not use the [facility] pharmacy. Can we also call Paxton Alanis at  when this is done so he knows?

## 2025-06-27 ENCOUNTER — TELEPHONE (OUTPATIENT)
Dept: FAMILY MEDICINE CLINIC | Facility: CLINIC | Age: 65
End: 2025-06-27
Payer: MEDICARE

## 2025-06-27 RX ORDER — ERGOCALCIFEROL 1.25 MG/1
50000 CAPSULE, LIQUID FILLED ORAL WEEKLY
Qty: 13 CAPSULE | Refills: 3 | Status: SHIPPED | OUTPATIENT
Start: 2025-06-27

## 2025-06-27 NOTE — TELEPHONE ENCOUNTER
Pt called office stated her prescription for vitamin D 1.25 MG CAPSULE WAS SENT TO WRONG PHARMACY. PT STATED SHE NEEDS IT SENT TO [facility].

## 2025-08-04 ENCOUNTER — OFFICE VISIT (OUTPATIENT)
Dept: FAMILY MEDICINE CLINIC | Facility: CLINIC | Age: 65
End: 2025-08-04
Payer: MEDICARE

## 2025-08-04 VITALS
SYSTOLIC BLOOD PRESSURE: 118 MMHG | WEIGHT: 146.1 LBS | HEART RATE: 77 BPM | DIASTOLIC BLOOD PRESSURE: 78 MMHG | TEMPERATURE: 98.3 F | HEIGHT: 63 IN | BODY MASS INDEX: 25.89 KG/M2 | OXYGEN SATURATION: 97 %

## 2025-08-04 DIAGNOSIS — R35.0 FREQUENCY OF URINATION: ICD-10-CM

## 2025-08-04 DIAGNOSIS — Z28.39 IMMUNIZATION DEFICIENCY: ICD-10-CM

## 2025-08-04 DIAGNOSIS — E55.9 VITAMIN D DEFICIENCY: ICD-10-CM

## 2025-08-04 DIAGNOSIS — E78.2 MIXED HYPERLIPIDEMIA: ICD-10-CM

## 2025-08-04 DIAGNOSIS — R30.0 DYSURIA: Primary | ICD-10-CM

## 2025-08-04 DIAGNOSIS — R73.03 PREDIABETES: ICD-10-CM

## 2025-08-04 LAB
25(OH)D3 SERPL-MCNC: 36.6 NG/ML (ref 30–100)
ALBUMIN SERPL-MCNC: 4.2 G/DL (ref 3.5–5.2)
ALBUMIN/GLOB SERPL: 1.3 G/DL
ALP SERPL-CCNC: 79 U/L (ref 39–117)
ALT SERPL W P-5'-P-CCNC: 12 U/L (ref 1–33)
ANION GAP SERPL CALCULATED.3IONS-SCNC: 10.2 MMOL/L (ref 5–15)
AST SERPL-CCNC: 35 U/L (ref 1–32)
BASOPHILS # BLD AUTO: 0.08 10*3/MM3 (ref 0–0.2)
BASOPHILS NFR BLD AUTO: 1 % (ref 0–1.5)
BILIRUB BLD-MCNC: ABNORMAL MG/DL
BILIRUB SERPL-MCNC: 0.3 MG/DL (ref 0–1.2)
BUN SERPL-MCNC: 13 MG/DL (ref 8–23)
BUN/CREAT SERPL: 32.5 (ref 7–25)
CALCIUM SPEC-SCNC: 9.1 MG/DL (ref 8.6–10.5)
CHLORIDE SERPL-SCNC: 107 MMOL/L (ref 98–107)
CHOLEST SERPL-MCNC: 189 MG/DL (ref 0–200)
CLARITY, POC: CLEAR
CO2 SERPL-SCNC: 22.8 MMOL/L (ref 22–29)
COLOR UR: ABNORMAL
CREAT SERPL-MCNC: 0.4 MG/DL (ref 0.57–1)
DEPRECATED RDW RBC AUTO: 40.9 FL (ref 37–54)
EGFRCR SERPLBLD CKD-EPI 2021: 110 ML/MIN/1.73
EOSINOPHIL # BLD AUTO: 0.13 10*3/MM3 (ref 0–0.4)
EOSINOPHIL NFR BLD AUTO: 1.6 % (ref 0.3–6.2)
ERYTHROCYTE [DISTWIDTH] IN BLOOD BY AUTOMATED COUNT: 13.2 % (ref 12.3–15.4)
GLOBULIN UR ELPH-MCNC: 3.3 GM/DL
GLUCOSE SERPL-MCNC: 103 MG/DL (ref 65–99)
GLUCOSE UR STRIP-MCNC: NEGATIVE MG/DL
HBA1C MFR BLD: 6.3 % (ref 4.8–5.6)
HCT VFR BLD AUTO: 42 % (ref 34–46.6)
HDLC SERPL-MCNC: 39 MG/DL (ref 40–60)
HGB BLD-MCNC: 13.7 G/DL (ref 12–15.9)
IMM GRANULOCYTES # BLD AUTO: 0.01 10*3/MM3 (ref 0–0.05)
IMM GRANULOCYTES NFR BLD AUTO: 0.1 % (ref 0–0.5)
KETONES UR QL: NEGATIVE
LDLC SERPL CALC-MCNC: 114 MG/DL (ref 0–100)
LDLC/HDLC SERPL: 2.79 {RATIO}
LEUKOCYTE EST, POC: NEGATIVE
LYMPHOCYTES # BLD AUTO: 2.36 10*3/MM3 (ref 0.7–3.1)
LYMPHOCYTES NFR BLD AUTO: 29.6 % (ref 19.6–45.3)
MCH RBC QN AUTO: 27.7 PG (ref 26.6–33)
MCHC RBC AUTO-ENTMCNC: 32.6 G/DL (ref 31.5–35.7)
MCV RBC AUTO: 85 FL (ref 79–97)
MONOCYTES # BLD AUTO: 0.38 10*3/MM3 (ref 0.1–0.9)
MONOCYTES NFR BLD AUTO: 4.8 % (ref 5–12)
NEUTROPHILS NFR BLD AUTO: 5.01 10*3/MM3 (ref 1.7–7)
NEUTROPHILS NFR BLD AUTO: 62.9 % (ref 42.7–76)
NITRITE UR-MCNC: NEGATIVE MG/ML
NRBC BLD AUTO-RTO: 0 /100 WBC (ref 0–0.2)
PH UR: 5.5 [PH] (ref 5–8)
PLATELET # BLD AUTO: 213 10*3/MM3 (ref 140–450)
PMV BLD AUTO: 9.9 FL (ref 6–12)
POTASSIUM SERPL-SCNC: 4 MMOL/L (ref 3.5–5.2)
PROT SERPL-MCNC: 7.5 G/DL (ref 6–8.5)
PROT UR STRIP-MCNC: NEGATIVE MG/DL
RBC # BLD AUTO: 4.94 10*6/MM3 (ref 3.77–5.28)
RBC # UR STRIP: ABNORMAL /UL
SODIUM SERPL-SCNC: 140 MMOL/L (ref 136–145)
SP GR UR: 1.02 (ref 1–1.03)
TRIGL SERPL-MCNC: 206 MG/DL (ref 0–150)
UROBILINOGEN UR QL: ABNORMAL
VLDLC SERPL-MCNC: 36 MG/DL (ref 5–40)
WBC NRBC COR # BLD AUTO: 7.97 10*3/MM3 (ref 3.4–10.8)

## 2025-08-04 PROCEDURE — 1125F AMNT PAIN NOTED PAIN PRSNT: CPT | Performed by: FAMILY MEDICINE

## 2025-08-04 PROCEDURE — 82306 VITAMIN D 25 HYDROXY: CPT | Performed by: FAMILY MEDICINE

## 2025-08-04 PROCEDURE — 83036 HEMOGLOBIN GLYCOSYLATED A1C: CPT | Performed by: FAMILY MEDICINE

## 2025-08-04 PROCEDURE — 81002 URINALYSIS NONAUTO W/O SCOPE: CPT | Performed by: FAMILY MEDICINE

## 2025-08-04 PROCEDURE — 36415 COLL VENOUS BLD VENIPUNCTURE: CPT | Performed by: FAMILY MEDICINE

## 2025-08-04 PROCEDURE — 80061 LIPID PANEL: CPT | Performed by: FAMILY MEDICINE

## 2025-08-04 PROCEDURE — 99214 OFFICE O/P EST MOD 30 MIN: CPT | Performed by: FAMILY MEDICINE

## 2025-08-04 PROCEDURE — 85025 COMPLETE CBC W/AUTO DIFF WBC: CPT | Performed by: FAMILY MEDICINE

## 2025-08-04 PROCEDURE — 87086 URINE CULTURE/COLONY COUNT: CPT | Performed by: FAMILY MEDICINE

## 2025-08-04 PROCEDURE — 80053 COMPREHEN METABOLIC PANEL: CPT | Performed by: FAMILY MEDICINE

## 2025-08-04 RX ORDER — NITROFURANTOIN 25; 75 MG/1; MG/1
100 CAPSULE ORAL 2 TIMES DAILY
Qty: 14 CAPSULE | Refills: 0 | Status: SHIPPED | OUTPATIENT
Start: 2025-08-04 | End: 2025-08-11

## 2025-08-06 LAB — BACTERIA SPEC AEROBE CULT: NO GROWTH

## (undated) DEVICE — 3M™ IOBAN™ 2 ANTIMICROBIAL INCISE DRAPE 6650EZ: Brand: IOBAN™ 2

## (undated) DEVICE — 3M™ STERI-STRIP™ REINFORCED ADHESIVE SKIN CLOSURES, R1547, 1/2 IN X 4 IN (12 MM X 100 MM), 6 STRIPS/ENVELOPE: Brand: 3M™ STERI-STRIP™

## (undated) DEVICE — GAUZE,SPONGE,4"X4",16PLY,STRL,LF,10/TRAY: Brand: MEDLINE

## (undated) DEVICE — GLV SURG ULTRAFREE MAX LTX PF 8

## (undated) DEVICE — SOL IRR NACL 0.9PCT BT 1000ML

## (undated) DEVICE — BNDG ELAS CO-FLEX SLF ADHR 6IN 5YD LF STRL

## (undated) DEVICE — EXTREMITY-LF: Brand: MEDLINE INDUSTRIES, INC.

## (undated) DEVICE — INTENDED FOR TISSUE SEPARATION, AND OTHER PROCEDURES THAT REQUIRE A SHARP SURGICAL BLADE TO PUNCTURE OR CUT.: Brand: BARD-PARKER ® CARBON RIB-BACK BLADES

## (undated) DEVICE — SUT PROLN 2/0 SH 36IN 8523H

## (undated) DEVICE — 1010 S-DRAPE TOWEL DRAPE 10/BX: Brand: STERI-DRAPE™

## (undated) DEVICE — NDL RIZA/RIBE W/GRASP/LP 16G

## (undated) DEVICE — GLV SURG SENSICARE SLT PF LF 7 STRL

## (undated) DEVICE — SUT ETHIB 2/0 SH SH 36IN X523H

## (undated) DEVICE — BNDG ELAS DELUXE REINF 10CM 5MTR STRL

## (undated) DEVICE — STERILE POLYISOPRENE POWDER-FREE SURGICAL GLOVES: Brand: PROTEXIS

## (undated) DEVICE — SUT VIC 3/0 SH 27IN J416H

## (undated) DEVICE — SUT ETHLN 3-0 FS118IN 663H

## (undated) DEVICE — BLD TONG INDIV/WRP A/ 6IN STRL

## (undated) DEVICE — BNDG ESMARK STRL 6INX12FT LF

## (undated) DEVICE — DRSNG GZ PETROLTM XEROFORM CURAD 1X8IN STRL

## (undated) DEVICE — APPL CHLORAPREP HI/LITE 26ML ORNG

## (undated) DEVICE — GLV SURG SENSICARE W/ALOE PF LF 7 STRL

## (undated) DEVICE — UNDERCAST PADDING: Brand: DEROYAL

## (undated) DEVICE — SUT ETHIB 3/0 SH DA 36IN X522H